# Patient Record
Sex: FEMALE | Race: WHITE | Employment: FULL TIME | URBAN - NONMETROPOLITAN AREA
[De-identification: names, ages, dates, MRNs, and addresses within clinical notes are randomized per-mention and may not be internally consistent; named-entity substitution may affect disease eponyms.]

---

## 2014-07-01 LAB
BUN BLDV-MCNC: 16 MG/DL
CALCIUM SERPL-MCNC: 9.8 MG/DL
CHLORIDE BLD-SCNC: 107 MMOL/L
CO2: 27 MMOL/L
CREAT SERPL-MCNC: 0.8 MG/DL
GFR CALCULATED: NORMAL
GLUCOSE BLD-MCNC: 94 MG/DL
POTASSIUM SERPL-SCNC: 4.5 MMOL/L
SODIUM BLD-SCNC: 144 MMOL/L

## 2017-02-11 LAB
CHOLESTEROL, TOTAL: 209 MG/DL
CHOLESTEROL/HDL RATIO: 4.4
HDLC SERPL-MCNC: 47 MG/DL (ref 35–70)
LDL CHOLESTEROL CALCULATED: 113 MG/DL (ref 0–160)
TRIGL SERPL-MCNC: 245 MG/DL
VLDLC SERPL CALC-MCNC: 49 MG/DL

## 2017-07-27 VITALS
HEIGHT: 67 IN | WEIGHT: 239 LBS | BODY MASS INDEX: 37.51 KG/M2 | DIASTOLIC BLOOD PRESSURE: 80 MMHG | SYSTOLIC BLOOD PRESSURE: 114 MMHG

## 2017-07-27 DIAGNOSIS — E78.49 OTHER HYPERLIPIDEMIA: ICD-10-CM

## 2017-07-27 DIAGNOSIS — F32.9 MAJOR DEPRESSIVE DISORDER, SINGLE EPISODE, UNSPECIFIED: ICD-10-CM

## 2017-07-27 DIAGNOSIS — I10 HYPERTENSION, ESSENTIAL: ICD-10-CM

## 2017-07-27 DIAGNOSIS — N92.0 EXCESSIVE OR FREQUENT MENSTRUATION: ICD-10-CM

## 2017-07-27 PROBLEM — E78.5 HYPERLIPIDEMIA: Status: ACTIVE | Noted: 2017-07-27

## 2017-08-04 ENCOUNTER — OFFICE VISIT (OUTPATIENT)
Dept: FAMILY MEDICINE CLINIC | Age: 52
End: 2017-08-04
Payer: COMMERCIAL

## 2017-08-04 VITALS
DIASTOLIC BLOOD PRESSURE: 60 MMHG | WEIGHT: 241 LBS | HEART RATE: 78 BPM | BODY MASS INDEX: 38.32 KG/M2 | SYSTOLIC BLOOD PRESSURE: 110 MMHG

## 2017-08-04 DIAGNOSIS — Z13.1 SCREENING FOR DIABETES MELLITUS: ICD-10-CM

## 2017-08-04 DIAGNOSIS — F32.9 MAJOR DEPRESSIVE DISORDER, SINGLE EPISODE, UNSPECIFIED: Primary | ICD-10-CM

## 2017-08-04 DIAGNOSIS — Z13.220 SCREENING, LIPID: ICD-10-CM

## 2017-08-04 DIAGNOSIS — I10 HYPERTENSION, ESSENTIAL: ICD-10-CM

## 2017-08-04 PROBLEM — N92.0 EXCESSIVE OR FREQUENT MENSTRUATION: Status: RESOLVED | Noted: 2017-07-27 | Resolved: 2017-08-04

## 2017-08-04 PROCEDURE — 99214 OFFICE O/P EST MOD 30 MIN: CPT | Performed by: FAMILY MEDICINE

## 2017-08-04 RX ORDER — CITALOPRAM 40 MG/1
1 TABLET ORAL DAILY
COMMUNITY
Start: 2017-06-19 | End: 2017-10-26 | Stop reason: SDUPTHER

## 2017-08-04 ASSESSMENT — ENCOUNTER SYMPTOMS
CONSTIPATION: 0
DIARRHEA: 0
ABDOMINAL PAIN: 0
SHORTNESS OF BREATH: 0

## 2017-08-04 ASSESSMENT — PATIENT HEALTH QUESTIONNAIRE - PHQ9
2. FEELING DOWN, DEPRESSED OR HOPELESS: 0
SUM OF ALL RESPONSES TO PHQ QUESTIONS 1-9: 0
1. LITTLE INTEREST OR PLEASURE IN DOING THINGS: 0
SUM OF ALL RESPONSES TO PHQ9 QUESTIONS 1 & 2: 0

## 2017-10-27 RX ORDER — CITALOPRAM 40 MG/1
40 TABLET ORAL DAILY
Qty: 30 TABLET | Refills: 5 | Status: SHIPPED | OUTPATIENT
Start: 2017-10-27 | End: 2018-02-14 | Stop reason: SDUPTHER

## 2018-02-09 ENCOUNTER — OFFICE VISIT (OUTPATIENT)
Dept: FAMILY MEDICINE CLINIC | Age: 53
End: 2018-02-09
Payer: COMMERCIAL

## 2018-02-09 VITALS
HEART RATE: 63 BPM | OXYGEN SATURATION: 97 % | SYSTOLIC BLOOD PRESSURE: 130 MMHG | DIASTOLIC BLOOD PRESSURE: 82 MMHG | BODY MASS INDEX: 38.51 KG/M2 | WEIGHT: 242.2 LBS

## 2018-02-09 DIAGNOSIS — F32.4 MAJOR DEPRESSIVE DISORDER WITH SINGLE EPISODE, IN PARTIAL REMISSION (HCC): Primary | ICD-10-CM

## 2018-02-09 DIAGNOSIS — I10 HYPERTENSION, ESSENTIAL: ICD-10-CM

## 2018-02-09 DIAGNOSIS — M79.671 FOOT PAIN, RIGHT: ICD-10-CM

## 2018-02-09 PROCEDURE — 99214 OFFICE O/P EST MOD 30 MIN: CPT | Performed by: FAMILY MEDICINE

## 2018-02-09 RX ORDER — CITALOPRAM 20 MG/1
50 TABLET ORAL DAILY
Qty: 225 TABLET | Refills: 3 | Status: SHIPPED | OUTPATIENT
Start: 2018-02-09 | End: 2019-05-29 | Stop reason: SDUPTHER

## 2018-02-09 RX ORDER — M-VIT,TX,IRON,MINS/CALC/FOLIC 27MG-0.4MG
1 TABLET ORAL DAILY
COMMUNITY

## 2018-02-09 RX ORDER — CETIRIZINE HYDROCHLORIDE 10 MG/1
10 TABLET ORAL DAILY
COMMUNITY

## 2018-02-09 ASSESSMENT — ENCOUNTER SYMPTOMS
WHEEZING: 0
BLURRED VISION: 0
DIARRHEA: 0
SHORTNESS OF BREATH: 0
CONSTIPATION: 0
COUGH: 1
ABDOMINAL PAIN: 0

## 2018-02-09 NOTE — PROGRESS NOTES
Review of Systems   Respiratory: Positive for cough ( little bit, started within the last few weeks. states nothing that seems to be getting any worse). Negative for shortness of breath and wheezing. Cardiovascular: Negative for chest pain, palpitations and leg swelling. Gastrointestinal: Negative for abdominal pain, constipation and diarrhea. Genitourinary: Negative for frequency and urgency. Musculoskeletal: Negative for joint pain and myalgias. Neurological: Negative for dizziness and headaches. Psychiatric/Behavioral: Negative for depression. The patient is nervous/anxious ( work related ). Would like to discuss increasing medication dosage due to increased anxiety. Hurt back a month ago now back is fine, muscle pain related after lifting a tv. Was taking a muscle relaxer. Had some dizziness/lightheadedness and felt warm. Believes it may be blood pressure related.

## 2018-02-14 DIAGNOSIS — F32.4 MAJOR DEPRESSIVE DISORDER WITH SINGLE EPISODE, IN PARTIAL REMISSION (HCC): ICD-10-CM

## 2018-02-14 RX ORDER — CITALOPRAM 40 MG/1
40 TABLET ORAL DAILY
Qty: 90 TABLET | Refills: 1 | Status: SHIPPED | OUTPATIENT
Start: 2018-02-14 | End: 2018-03-14 | Stop reason: CLARIF

## 2018-02-14 RX ORDER — CITALOPRAM 20 MG/1
50 TABLET ORAL DAILY
Qty: 225 TABLET | Refills: 3 | Status: CANCELLED | OUTPATIENT
Start: 2018-02-14

## 2018-02-14 NOTE — TELEPHONE ENCOUNTER
Kerri Castro is calling to request a refill on the following medication(s):  Requested Prescriptions     Pending Prescriptions Disp Refills    citalopram (CELEXA) 20 MG tablet 225 tablet 3     Sig: Take 2.5 tablets by mouth daily       Last Visit Date (If Applicable):  3/2/1944    Next Visit Date:    3/21/2018

## 2018-02-14 NOTE — TELEPHONE ENCOUNTER
Claudetta Piano is calling to request a refill on the following medication(s):  Requested Prescriptions     Pending Prescriptions Disp Refills    citalopram (CELEXA) 40 MG tablet 90 tablet 1     Sig: Take 1 tablet by mouth daily       Last Visit Date (If Applicable):  1/9/3491    Next Visit Date:    3/21/2018

## 2018-02-19 ENCOUNTER — TELEPHONE (OUTPATIENT)
Dept: FAMILY MEDICINE CLINIC | Age: 53
End: 2018-02-19

## 2018-02-28 ENCOUNTER — OFFICE VISIT (OUTPATIENT)
Dept: PODIATRY | Age: 53
End: 2018-02-28
Payer: COMMERCIAL

## 2018-02-28 VITALS
RESPIRATION RATE: 20 BRPM | WEIGHT: 244 LBS | DIASTOLIC BLOOD PRESSURE: 74 MMHG | HEART RATE: 72 BPM | HEIGHT: 67 IN | SYSTOLIC BLOOD PRESSURE: 120 MMHG | BODY MASS INDEX: 38.3 KG/M2

## 2018-02-28 DIAGNOSIS — M19.079 INFLAMMATION OF FOOT JOINT: ICD-10-CM

## 2018-02-28 DIAGNOSIS — M20.5X1 HALLUX LIMITUS OF RIGHT FOOT: Primary | ICD-10-CM

## 2018-02-28 PROCEDURE — 99202 OFFICE O/P NEW SF 15 MIN: CPT | Performed by: PODIATRIST

## 2018-03-05 ENCOUNTER — TELEPHONE (OUTPATIENT)
Dept: PODIATRY | Age: 53
End: 2018-03-05

## 2018-03-14 ENCOUNTER — OFFICE VISIT (OUTPATIENT)
Dept: FAMILY MEDICINE CLINIC | Age: 53
End: 2018-03-14
Payer: COMMERCIAL

## 2018-03-14 VITALS
HEART RATE: 66 BPM | BODY MASS INDEX: 38.28 KG/M2 | WEIGHT: 244.4 LBS | DIASTOLIC BLOOD PRESSURE: 88 MMHG | SYSTOLIC BLOOD PRESSURE: 132 MMHG

## 2018-03-14 DIAGNOSIS — M21.611 BUNION, RIGHT FOOT: Primary | ICD-10-CM

## 2018-03-14 DIAGNOSIS — I10 HYPERTENSION, ESSENTIAL: ICD-10-CM

## 2018-03-14 DIAGNOSIS — F32.4 MAJOR DEPRESSIVE DISORDER WITH SINGLE EPISODE, IN PARTIAL REMISSION (HCC): ICD-10-CM

## 2018-03-14 PROCEDURE — 93000 ELECTROCARDIOGRAM COMPLETE: CPT | Performed by: FAMILY MEDICINE

## 2018-03-14 PROCEDURE — 99213 OFFICE O/P EST LOW 20 MIN: CPT | Performed by: FAMILY MEDICINE

## 2018-03-18 ENCOUNTER — TELEPHONE (OUTPATIENT)
Dept: FAMILY MEDICINE CLINIC | Age: 53
End: 2018-03-18

## 2018-03-18 NOTE — TELEPHONE ENCOUNTER
Please let Loren Nino know that I do have the name and info for a physician for her daughter in [place]. I do not know this physician personally but I do know she is a [facility] Physician.     Dr. Theresa Hardin  Specialties : Family Medicine  [place], [place], [address], [address]

## 2018-03-23 ENCOUNTER — APPOINTMENT (OUTPATIENT)
Dept: GENERAL RADIOLOGY | Age: 53
End: 2018-03-23
Attending: PODIATRIST
Payer: COMMERCIAL

## 2018-03-23 ENCOUNTER — ANESTHESIA EVENT (OUTPATIENT)
Dept: OPERATING ROOM | Age: 53
End: 2018-03-23
Payer: COMMERCIAL

## 2018-03-23 ENCOUNTER — ANESTHESIA (OUTPATIENT)
Dept: OPERATING ROOM | Age: 53
End: 2018-03-23
Payer: COMMERCIAL

## 2018-03-23 ENCOUNTER — HOSPITAL ENCOUNTER (OUTPATIENT)
Age: 53
Setting detail: OUTPATIENT SURGERY
Discharge: HOME | End: 2018-03-23
Attending: PODIATRIST | Admitting: PODIATRIST
Payer: COMMERCIAL

## 2018-03-23 VITALS
RESPIRATION RATE: 9 BRPM | SYSTOLIC BLOOD PRESSURE: 112 MMHG | OXYGEN SATURATION: 96 % | DIASTOLIC BLOOD PRESSURE: 57 MMHG

## 2018-03-23 VITALS
RESPIRATION RATE: 16 BRPM | TEMPERATURE: 97.4 F | HEIGHT: 68 IN | HEART RATE: 53 BPM | BODY MASS INDEX: 36.8 KG/M2 | WEIGHT: 242.8 LBS | DIASTOLIC BLOOD PRESSURE: 62 MMHG | OXYGEN SATURATION: 93 % | SYSTOLIC BLOOD PRESSURE: 119 MMHG

## 2018-03-23 DIAGNOSIS — M20.5X1 HALLUX LIMITUS, RIGHT: Primary | ICD-10-CM

## 2018-03-23 DIAGNOSIS — M79.671 RIGHT FOOT PAIN: ICD-10-CM

## 2018-03-23 PROCEDURE — 2500000003 HC RX 250 WO HCPCS

## 2018-03-23 PROCEDURE — 73630 X-RAY EXAM OF FOOT: CPT

## 2018-03-23 PROCEDURE — 2500000003 HC RX 250 WO HCPCS: Performed by: NURSE ANESTHETIST, CERTIFIED REGISTERED

## 2018-03-23 PROCEDURE — 6360000002 HC RX W HCPCS

## 2018-03-23 PROCEDURE — 88311 DECALCIFY TISSUE: CPT

## 2018-03-23 PROCEDURE — 6360000002 HC RX W HCPCS: Performed by: PODIATRIST

## 2018-03-23 PROCEDURE — 3600000012 HC SURGERY LEVEL 2 ADDTL 15MIN: Performed by: PODIATRIST

## 2018-03-23 PROCEDURE — 3700000001 HC ADD 15 MINUTES (ANESTHESIA): Performed by: PODIATRIST

## 2018-03-23 PROCEDURE — 88304 TISSUE EXAM BY PATHOLOGIST: CPT

## 2018-03-23 PROCEDURE — 3700000000 HC ANESTHESIA ATTENDED CARE: Performed by: PODIATRIST

## 2018-03-23 PROCEDURE — 2500000003 HC RX 250 WO HCPCS: Performed by: PODIATRIST

## 2018-03-23 PROCEDURE — 7100000010 HC PHASE II RECOVERY - FIRST 15 MIN: Performed by: PODIATRIST

## 2018-03-23 PROCEDURE — 28289 CORRJ HALUX RIGDUS W/O IMPLT: CPT | Performed by: PODIATRIST

## 2018-03-23 PROCEDURE — 01480 ANES OPEN PX LOWER L/A/F NOS: CPT | Performed by: NURSE ANESTHETIST, CERTIFIED REGISTERED

## 2018-03-23 PROCEDURE — 6360000002 HC RX W HCPCS: Performed by: NURSE ANESTHETIST, CERTIFIED REGISTERED

## 2018-03-23 PROCEDURE — 2580000003 HC RX 258: Performed by: PODIATRIST

## 2018-03-23 PROCEDURE — A6454 SELF-ADHER BAND W>=3" <5"/YD: HCPCS | Performed by: PODIATRIST

## 2018-03-23 PROCEDURE — 7100000011 HC PHASE II RECOVERY - ADDTL 15 MIN: Performed by: PODIATRIST

## 2018-03-23 PROCEDURE — 3600000002 HC SURGERY LEVEL 2 BASE: Performed by: PODIATRIST

## 2018-03-23 RX ORDER — HYDROCODONE BITARTRATE AND ACETAMINOPHEN 5; 325 MG/1; MG/1
1 TABLET ORAL PRN
Status: DISCONTINUED | OUTPATIENT
Start: 2018-03-23 | End: 2018-03-23 | Stop reason: HOSPADM

## 2018-03-23 RX ORDER — PROPOFOL 10 MG/ML
INJECTION, EMULSION INTRAVENOUS PRN
Status: DISCONTINUED | OUTPATIENT
Start: 2018-03-23 | End: 2018-03-23 | Stop reason: SDUPTHER

## 2018-03-23 RX ORDER — MIDAZOLAM HYDROCHLORIDE 1 MG/ML
INJECTION INTRAMUSCULAR; INTRAVENOUS PRN
Status: DISCONTINUED | OUTPATIENT
Start: 2018-03-23 | End: 2018-03-23 | Stop reason: SDUPTHER

## 2018-03-23 RX ORDER — LIDOCAINE HYDROCHLORIDE 20 MG/ML
INJECTION, SOLUTION INFILTRATION; PERINEURAL PRN
Status: DISCONTINUED | OUTPATIENT
Start: 2018-03-23 | End: 2018-03-23 | Stop reason: SDUPTHER

## 2018-03-23 RX ORDER — KETOROLAC TROMETHAMINE 30 MG/ML
INJECTION, SOLUTION INTRAMUSCULAR; INTRAVENOUS PRN
Status: DISCONTINUED | OUTPATIENT
Start: 2018-03-23 | End: 2018-03-23 | Stop reason: SDUPTHER

## 2018-03-23 RX ORDER — ONDANSETRON 2 MG/ML
4 INJECTION INTRAMUSCULAR; INTRAVENOUS
Status: DISCONTINUED | OUTPATIENT
Start: 2018-03-23 | End: 2018-03-23 | Stop reason: HOSPADM

## 2018-03-23 RX ORDER — PROPOFOL 10 MG/ML
INJECTION, EMULSION INTRAVENOUS CONTINUOUS PRN
Status: DISCONTINUED | OUTPATIENT
Start: 2018-03-23 | End: 2018-03-23 | Stop reason: SDUPTHER

## 2018-03-23 RX ORDER — MORPHINE SULFATE 2 MG/ML
2 INJECTION, SOLUTION INTRAMUSCULAR; INTRAVENOUS EVERY 5 MIN PRN
Status: DISCONTINUED | OUTPATIENT
Start: 2018-03-23 | End: 2018-03-23 | Stop reason: HOSPADM

## 2018-03-23 RX ORDER — DIPHENHYDRAMINE HYDROCHLORIDE 50 MG/ML
12.5 INJECTION INTRAMUSCULAR; INTRAVENOUS
Status: DISCONTINUED | OUTPATIENT
Start: 2018-03-23 | End: 2018-03-23 | Stop reason: HOSPADM

## 2018-03-23 RX ORDER — HYDROCODONE BITARTRATE AND ACETAMINOPHEN 5; 325 MG/1; MG/1
2 TABLET ORAL PRN
Status: DISCONTINUED | OUTPATIENT
Start: 2018-03-23 | End: 2018-03-23 | Stop reason: HOSPADM

## 2018-03-23 RX ORDER — FENTANYL CITRATE 50 UG/ML
INJECTION, SOLUTION INTRAMUSCULAR; INTRAVENOUS PRN
Status: DISCONTINUED | OUTPATIENT
Start: 2018-03-23 | End: 2018-03-23 | Stop reason: SDUPTHER

## 2018-03-23 RX ORDER — HYDROCODONE BITARTRATE AND ACETAMINOPHEN 5; 325 MG/1; MG/1
1 TABLET ORAL EVERY 6 HOURS PRN
Qty: 28 TABLET | Refills: 0 | Status: SHIPPED | OUTPATIENT
Start: 2018-03-23 | End: 2018-03-30

## 2018-03-23 RX ORDER — SODIUM CHLORIDE, SODIUM LACTATE, POTASSIUM CHLORIDE, CALCIUM CHLORIDE 600; 310; 30; 20 MG/100ML; MG/100ML; MG/100ML; MG/100ML
INJECTION, SOLUTION INTRAVENOUS CONTINUOUS
Status: DISCONTINUED | OUTPATIENT
Start: 2018-03-23 | End: 2018-03-23 | Stop reason: HOSPADM

## 2018-03-23 RX ADMIN — LIDOCAINE HYDROCHLORIDE 100 MG: 20 INJECTION, SOLUTION INFILTRATION; PERINEURAL at 09:26

## 2018-03-23 RX ADMIN — Medication 2 G: at 09:34

## 2018-03-23 RX ADMIN — FENTANYL CITRATE 25 MCG: 50 INJECTION, SOLUTION INTRAMUSCULAR; INTRAVENOUS at 09:32

## 2018-03-23 RX ADMIN — SODIUM CHLORIDE, POTASSIUM CHLORIDE, SODIUM LACTATE AND CALCIUM CHLORIDE: 600; 310; 30; 20 INJECTION, SOLUTION INTRAVENOUS at 09:16

## 2018-03-23 RX ADMIN — MIDAZOLAM HYDROCHLORIDE 2 MG: 1 INJECTION, SOLUTION INTRAMUSCULAR; INTRAVENOUS at 09:21

## 2018-03-23 RX ADMIN — KETOROLAC TROMETHAMINE 30 MG: 30 INJECTION, SOLUTION INTRAMUSCULAR; INTRAVENOUS at 09:21

## 2018-03-23 RX ADMIN — FENTANYL CITRATE 50 MCG: 50 INJECTION, SOLUTION INTRAMUSCULAR; INTRAVENOUS at 09:26

## 2018-03-23 RX ADMIN — PROPOFOL 70 MG: 10 INJECTION, EMULSION INTRAVENOUS at 09:26

## 2018-03-23 RX ADMIN — FENTANYL CITRATE 25 MCG: 50 INJECTION, SOLUTION INTRAMUSCULAR; INTRAVENOUS at 09:44

## 2018-03-23 RX ADMIN — PROPOFOL 100 MCG/KG/MIN: 10 INJECTION, EMULSION INTRAVENOUS at 09:24

## 2018-03-23 ASSESSMENT — PAIN SCALES - GENERAL
PAINLEVEL_OUTOF10: 0

## 2018-03-23 ASSESSMENT — PAIN DESCRIPTION - DESCRIPTORS: DESCRIPTORS: TINGLING

## 2018-03-23 ASSESSMENT — PAIN - FUNCTIONAL ASSESSMENT: PAIN_FUNCTIONAL_ASSESSMENT: 0-10

## 2018-03-23 NOTE — ANESTHESIA PRE PROCEDURE
during the procedure. Common risks: N/V, slowed breathing, and awareness. Uncommon Risks: Respiratory arrest requiring advanced airway management, stroke, or death.   )  Induction: intravenous. Anesthetic plan and risks discussed with patient.       Plan discussed with surgical team.                  Mary Ellen Kelly CRNA   3/23/2018

## 2018-03-23 NOTE — OP NOTE
3/23/18  Surgeon: Shruthi Drew DPM  Pre-operative diagnosis:hallux limitus R  Post-operative diagnosis: same as pre op  Procedure:cheilectomy R 1st MPJ  Anesthesia:MAC  Complications: none  EBL: <5  Specimen: none  Findings and Procedure: This 47 y.o. female presents to [facility] for surgical intervention for hallux limitus R. This patient has failed outpatient conservative therapy. Patient is NPO since midnight. H&P, allergy history, and consent form are signed and in the chart. In the pre-operative area an IV was started then patient taken to the operating room and placed on the operating table in a supine position. After induction of anesthesia, a total of 10 cc 50:50 mixture of 1% lidocaine plain and .25% marcaine plain were used to block the operative site. The Right foot was prepped and draped in the usual sterile manner. Anesthesia was checked and was adequate. A #15 blade was used for a linear dorsal incision. Dissection went deep and care was used to protect or ligate any neurovascular structures as needed. undelrying capsule then identified and incised with deep #15 blade, large bone fragment free floating over the 1st MPJ, freed up and removed, dorsal exostosis R 1st MPJ freed up and removed with sagittal saw, power rasp used to smooth all edges, area flushed with normal saline, bone sent to path, 3-0 vicryl to close capsule and sub q tissue, 4-0 nylon to close skin in simple interuppted fashion . A dry sterile dressing took place with adaptic, 4X4s, sai and coban. Patient had a prompt hyperemic response noted to the digits upon release of the tourniquet. Patient was taken to the post operative unit with vital signs stable and neurovascular status intact. They will ice and elevate as directed. Other orders were: xrays. A sx shoe was dispensed for appropriate offloading. Rx given were norco #30. Post operative directions were dispensed. Follow up in 1  weeks.

## 2018-03-27 LAB — SURGICAL PATHOLOGY REPORT: NORMAL

## 2018-03-30 ENCOUNTER — OFFICE VISIT (OUTPATIENT)
Dept: PODIATRY | Age: 53
End: 2018-03-30

## 2018-03-30 VITALS
TEMPERATURE: 98.6 F | WEIGHT: 245.8 LBS | SYSTOLIC BLOOD PRESSURE: 132 MMHG | HEIGHT: 68 IN | DIASTOLIC BLOOD PRESSURE: 80 MMHG | HEART RATE: 80 BPM | RESPIRATION RATE: 20 BRPM | BODY MASS INDEX: 37.25 KG/M2

## 2018-03-30 DIAGNOSIS — M20.5X1 HALLUX LIMITUS, RIGHT: Primary | ICD-10-CM

## 2018-03-30 PROCEDURE — 99024 POSTOP FOLLOW-UP VISIT: CPT | Performed by: PODIATRIST

## 2018-04-06 ENCOUNTER — OFFICE VISIT (OUTPATIENT)
Dept: PODIATRY | Age: 53
End: 2018-04-06

## 2018-04-06 VITALS
WEIGHT: 245 LBS | DIASTOLIC BLOOD PRESSURE: 78 MMHG | TEMPERATURE: 98 F | HEIGHT: 68 IN | HEART RATE: 80 BPM | BODY MASS INDEX: 37.13 KG/M2 | SYSTOLIC BLOOD PRESSURE: 130 MMHG

## 2018-04-06 DIAGNOSIS — M20.5X1 HALLUX LIMITUS, RIGHT: Primary | ICD-10-CM

## 2018-04-06 PROCEDURE — 99024 POSTOP FOLLOW-UP VISIT: CPT | Performed by: PODIATRIST

## 2018-04-20 ENCOUNTER — OFFICE VISIT (OUTPATIENT)
Dept: PODIATRY | Age: 53
End: 2018-04-20

## 2018-04-20 VITALS
BODY MASS INDEX: 38.77 KG/M2 | SYSTOLIC BLOOD PRESSURE: 110 MMHG | WEIGHT: 247 LBS | HEIGHT: 67 IN | DIASTOLIC BLOOD PRESSURE: 64 MMHG | HEART RATE: 72 BPM

## 2018-04-20 DIAGNOSIS — M20.5X1 HALLUX LIMITUS, RIGHT: Primary | ICD-10-CM

## 2018-04-20 DIAGNOSIS — M19.079 INFLAMMATION OF FOOT JOINT: ICD-10-CM

## 2018-04-20 PROCEDURE — 99024 POSTOP FOLLOW-UP VISIT: CPT | Performed by: PODIATRIST

## 2018-04-20 RX ORDER — METHYLPREDNISOLONE 4 MG/1
TABLET ORAL
Qty: 1 KIT | Refills: 0 | Status: SHIPPED | OUTPATIENT
Start: 2018-04-20 | End: 2018-05-11 | Stop reason: ALTCHOICE

## 2018-05-11 ENCOUNTER — OFFICE VISIT (OUTPATIENT)
Dept: FAMILY MEDICINE CLINIC | Age: 53
End: 2018-05-11
Payer: COMMERCIAL

## 2018-05-11 VITALS
SYSTOLIC BLOOD PRESSURE: 138 MMHG | HEART RATE: 67 BPM | BODY MASS INDEX: 38.15 KG/M2 | WEIGHT: 243.6 LBS | DIASTOLIC BLOOD PRESSURE: 86 MMHG | OXYGEN SATURATION: 96 %

## 2018-05-11 DIAGNOSIS — M20.5X1 HALLUX LIMITUS, RIGHT: ICD-10-CM

## 2018-05-11 DIAGNOSIS — K52.9 ACUTE GASTROENTERITIS: ICD-10-CM

## 2018-05-11 DIAGNOSIS — F32.4 MAJOR DEPRESSIVE DISORDER WITH SINGLE EPISODE, IN PARTIAL REMISSION (HCC): Primary | ICD-10-CM

## 2018-05-11 PROCEDURE — 99214 OFFICE O/P EST MOD 30 MIN: CPT | Performed by: FAMILY MEDICINE

## 2018-11-26 ENCOUNTER — OFFICE VISIT (OUTPATIENT)
Dept: FAMILY MEDICINE CLINIC | Age: 53
End: 2018-11-26
Payer: COMMERCIAL

## 2018-11-26 VITALS
HEART RATE: 67 BPM | WEIGHT: 242.31 LBS | OXYGEN SATURATION: 98 % | BODY MASS INDEX: 37.95 KG/M2 | DIASTOLIC BLOOD PRESSURE: 88 MMHG | SYSTOLIC BLOOD PRESSURE: 134 MMHG

## 2018-11-26 DIAGNOSIS — I10 HYPERTENSION, ESSENTIAL: ICD-10-CM

## 2018-11-26 DIAGNOSIS — Z13.1 SCREENING FOR DIABETES MELLITUS: ICD-10-CM

## 2018-11-26 DIAGNOSIS — Z13.220 SCREENING, LIPID: ICD-10-CM

## 2018-11-26 DIAGNOSIS — F32.5 MAJOR DEPRESSIVE DISORDER WITH SINGLE EPISODE, IN FULL REMISSION (HCC): Primary | ICD-10-CM

## 2018-11-26 DIAGNOSIS — Z12.39 SCREENING BREAST EXAMINATION: ICD-10-CM

## 2018-11-26 DIAGNOSIS — Z12.11 ENCOUNTER FOR SCREENING COLONOSCOPY: ICD-10-CM

## 2018-11-26 PROCEDURE — 99214 OFFICE O/P EST MOD 30 MIN: CPT | Performed by: FAMILY MEDICINE

## 2018-11-26 ASSESSMENT — ENCOUNTER SYMPTOMS
BLURRED VISION: 0
SHORTNESS OF BREATH: 0
ORTHOPNEA: 0

## 2018-11-26 NOTE — LETTER
[facility]  [address] [place]  Phone: [phone]  Fax: [phone]    Dayne Willett MD        December 4, 2018    Malinda Capellan  [address] [facility]      Dear Malinda Capellan:    According to the radiologists interpretation, your mammogram did not show any significant findings. Please remember that some cancers (about 10-15%) cannot be found by mammography alone, and that early detection requires a combination of monthly self-examination, yearly physical examination, and periodic mammography. The American Cancer Society Guidelines recommend screening mammograms and physical breast examinations every year beginning at the age of 36. Please continue regular breast self-examinations and report any changes that concern you, even before your next appointment. If you have any further questions, please don't hesitate to call the office.     Sincerely,           Dayne Willett MD

## 2018-11-26 NOTE — PROGRESS NOTES
1ST MP JT W/O IMPLT Right 3/23/2018    Right 1st MPJ CHEILECTOMY performed by Jose C Galvan DPM at [facility] OR       Family History   Problem Relation Age of Onset    Heart Disease Mother     Diabetes Mother     High Cholesterol Mother     Diabetes Father     Anxiety Disorder Daughter        Social History   Substance Use Topics    Smoking status: Never Smoker    Smokeless tobacco: Never Used    Alcohol use No      Current Outpatient Prescriptions   Medication Sig Dispense Refill    Multiple Vitamins-Minerals (THERAPEUTIC MULTIVITAMIN-MINERALS) tablet Take 1 tablet by mouth daily      cetirizine (ZYRTEC) 10 MG tablet Take 10 mg by mouth daily      citalopram (CELEXA) 20 MG tablet Take 2.5 tablets by mouth daily 225 tablet 3     No current facility-administered medications for this visit. No Known Allergies    Health Maintenance   Topic Date Due    HIV screen  03/25/1981    DTaP/Tdap/Td vaccine (1 - Tdap) 03/25/1985    Diabetes screen  03/25/2006    Potassium monitoring  07/01/2015    Creatinine monitoring  07/01/2015    Breast cancer screen  03/25/2016    Shingles Vaccine (1 of 2 - 2 Dose Series) 03/25/2016    Colon cancer screen colonoscopy  03/25/2016    Lipid screen  02/11/2022    Flu vaccine  Completed       Subjective:      Review of Systems   Constitutional: Negative for malaise/fatigue. Eyes: Negative for blurred vision. Respiratory: Negative for shortness of breath. Cardiovascular: Negative for chest pain, palpitations, orthopnea and PND. Musculoskeletal: Negative for neck pain. Neurological: Negative for headaches. Objective:     /88   Pulse 67   Wt 242 lb 5 oz (109.9 kg)   SpO2 98%   BMI 37.95 kg/m²     Physical Exam   Constitutional: She is oriented to person, place, and time. She appears well-developed and well-nourished. HENT:   Head: Normocephalic and atraumatic. Eyes: Conjunctivae and EOM are normal.   Neck: Normal range of motion. Neck supple.

## 2018-12-20 ENCOUNTER — OFFICE VISIT (OUTPATIENT)
Dept: SURGERY | Age: 53
End: 2018-12-20
Payer: COMMERCIAL

## 2018-12-20 VITALS — SYSTOLIC BLOOD PRESSURE: 158 MMHG | HEART RATE: 73 BPM | DIASTOLIC BLOOD PRESSURE: 93 MMHG | TEMPERATURE: 98 F

## 2018-12-20 DIAGNOSIS — R53.83 FATIGUE, UNSPECIFIED TYPE: ICD-10-CM

## 2018-12-20 DIAGNOSIS — Z12.11 ENCOUNTER FOR SCREENING COLONOSCOPY: Primary | ICD-10-CM

## 2018-12-20 PROCEDURE — 99203 OFFICE O/P NEW LOW 30 MIN: CPT | Performed by: SURGERY

## 2018-12-21 NOTE — PROGRESS NOTES
Medications:   Prior to Admission medications    Medication Sig Start Date End Date Taking? Authorizing Provider   Multiple Vitamins-Minerals (THERAPEUTIC MULTIVITAMIN-MINERALS) tablet Take 1 tablet by mouth daily   Yes Historical Provider, MD   cetirizine (ZYRTEC) 10 MG tablet Take 10 mg by mouth daily   Yes Historical Provider, MD   citalopram (CELEXA) 20 MG tablet Take 2.5 tablets by mouth daily 2/9/18  Yes Krista Cronin MD       Allergies  Patient has no known allergies. Review of Systems:  General: Denies any fever, chills. Reports fatigue  Eyes: Denies any changes in vision, diplopia or eye pain  Ears, Nose, Mouth: Denies changes in hearing/tinnitus or drainage from ears, no rhinorrhea or bloody nose, no difficulty chewing  Throat: no difficulty swallowing, no throat pain  Respiratory: Denies any shortness of breath or cough. Cardiac: Denies any chest pain, palpitations, claudication or edema. Gastrointestinal: Denies any melena, hematochezia, hematemesis or pyrosis. Genitourinary: Denies any frequency, urgency, hesitancy or incontinence. Musculoskeletal: Denies worsening muscle weakness or recent trauma  Skin: Denies rashes or lesions  Psychiatric: Denies any recent changes in mood or affect  Hematologic: Denies bruising or bleeding easily. PHYSICAL EXAMINATION  Vitals:   Vitals:    12/20/18 1628   BP: (!) 158/93   Pulse: 73   Temp: 98 °F (36.7 °C)       General Appearance:  awake, alert, no acute distress, well developed, well nourished   Skin:  Skin color, texture, turgor normal. No rashes or lesions. Head/face:  NCAT, face symmetrical  Eyes:  PERRL, no evidence of conjunctivitis or ptosis bilaterally  Ears:  External ears and canals grossly normal, no evidence of otorrhea. Nose/Sinuses:  Nares normal. Septum midline. Mucosa normal. No external drainage noted. Mouth/Neck:  Mucosa moist.  No external oral lesions. Trachea midline. No visible masses.    Lungs:  Normal chest expansion,

## 2019-05-29 ENCOUNTER — OFFICE VISIT (OUTPATIENT)
Dept: FAMILY MEDICINE CLINIC | Age: 54
End: 2019-05-29
Payer: COMMERCIAL

## 2019-05-29 VITALS
OXYGEN SATURATION: 97 % | SYSTOLIC BLOOD PRESSURE: 126 MMHG | BODY MASS INDEX: 37.7 KG/M2 | DIASTOLIC BLOOD PRESSURE: 88 MMHG | WEIGHT: 240.7 LBS | HEART RATE: 67 BPM

## 2019-05-29 DIAGNOSIS — F32.4 MAJOR DEPRESSIVE DISORDER WITH SINGLE EPISODE, IN PARTIAL REMISSION (HCC): ICD-10-CM

## 2019-05-29 PROCEDURE — 99214 OFFICE O/P EST MOD 30 MIN: CPT | Performed by: FAMILY MEDICINE

## 2019-05-29 RX ORDER — CITALOPRAM 20 MG/1
40 TABLET ORAL DAILY
Qty: 180 TABLET | Refills: 3 | Status: SHIPPED | OUTPATIENT
Start: 2019-05-29 | End: 2020-06-23

## 2019-05-29 ASSESSMENT — ENCOUNTER SYMPTOMS
ABDOMINAL PAIN: 0
COUGH: 0
SHORTNESS OF BREATH: 0
WHEEZING: 0
CONSTIPATION: 0
DIARRHEA: 0

## 2019-05-29 NOTE — PROGRESS NOTES
[address] [place] [address] [address]  Dept: [phone]  Dept [facility]:[phone]    Martha Isaac is a 49 y.o. female who presents today for her medical conditions/complaints as notedbelow. Martha Isaac is c/o of 6 Month Follow-Up and Depression      HPI:     HPI    Has been on the citalopram and has been doing well on this. Did try to wean down on this. Was on the 50 mg dose and was cutting down by going to every other day. Has had a lot of regla in her life. When she started trying to wean down by going to the 50 mg every other day she started to be very irritated by little things, not nice at times to her  and more short tempered really.   Still sleeping well and not really down or depressed, just more irritable     BP Readings from Last 3 Encounters:   19 126/88   18 (!) 158/93   18 134/88          (goal 120/80)    Wt Readings from Last 3 Encounters:   19 240 lb 11.2 oz (109.2 kg)   18 242 lb 5 oz (109.9 kg)   18 243 lb 9.6 oz (110.5 kg)        Past Medical History:   Diagnosis Date    Depression     Hyperlipidemia     Seasonal allergies       Past Surgical History:   Procedure Laterality Date     SECTION      COLONOSCOPY      [facility] at [facility] with Dr. Anselmo Jean, TOTAL ABDOMINAL  2013    fibroids    NM HALLUX RIGIDUS W/CHEILECTOMY 1ST MP JT W/O IMPLT Right 3/23/2018    Right 1st MPJ CHEILECTOMY performed by Denette Burkitt, DPM at [facility] 197 History   Problem Relation Age of Onset    Heart Disease Mother     Diabetes Mother     High Cholesterol Mother     Diabetes Father     Anxiety Disorder Daughter        Social History     Tobacco Use    Smoking status: Never Smoker    Smokeless tobacco: Never Used   Substance Use Topics    Alcohol use: No      Current Outpatient Medications   Medication Sig Dispense Refill    citalopram (CELEXA) 20 MG tablet Take 2 tablets by mouth daily 180 tablet 3    Multiple Vitamins-Minerals (THERAPEUTIC MULTIVITAMIN-MINERALS) tablet Take 1 tablet by mouth daily      cetirizine (ZYRTEC) 10 MG tablet Take 10 mg by mouth daily       No current facility-administered medications for this visit. No Known Allergies    Health Maintenance   Topic Date Due    HIV screen  03/25/1981    DTaP/Tdap/Td vaccine (1 - Tdap) 03/25/1985    Potassium monitoring  07/01/2015    Creatinine monitoring  07/01/2015    Shingles Vaccine (1 of 2) 03/25/2016    Breast cancer screen  12/03/2020    Lipid screen  02/11/2022    Colon cancer screen colonoscopy  01/04/2029    Flu vaccine  Completed    Pneumococcal 0-64 years Vaccine  Aged Out       Subjective:      Review of Systems  Respiratory: Negative for cough, shortness of breath and wheezing. Cardiovascular: Negative for chest pain, palpitations and leg swelling. Gastrointestinal: Negative for abdominal pain, constipation and diarrhea. Genitourinary: Negative for frequency and urgency. Neurological: Negative for dizziness and headaches. Objective:     /88   Pulse 67   Wt 240 lb 11.2 oz (109.2 kg)   SpO2 97%   BMI 37.70 kg/m²     Physical Exam   Constitutional: She is oriented to person, place, and time. She appears well-developed and well-nourished. HENT:   Head: Normocephalic and atraumatic. Eyes: Conjunctivae and EOM are normal.   Neck: Normal range of motion. Neck supple. No JVD present. No thyromegaly present. Cardiovascular: Normal rate, regular rhythm and intact distal pulses. Exam reveals no gallop and no friction rub. No murmur heard. Pulmonary/Chest: Effort normal and breath sounds normal. No respiratory distress. Abdominal: Soft. Musculoskeletal: She exhibits no edema. Lymphadenopathy:     She has no cervical adenopathy. Neurological: She is alert and oriented to person, place, and time. Skin: Skin is warm.    Nursing note and vitals reviewed. Assessment/Plan:      Diagnosis Orders   1. Major depressive disorder with single episode, in partial remission (HCC)  citalopram (CELEXA) 20 MG tablet     Discussed weaning on the citalopram very slowly to have better chance of success with the symptoms remaining abated. Lab Results   Component Value Date    CHOL 209 02/11/2017    TRIG 245 02/11/2017    HDL 47 02/11/2017     07/01/2014    K 4.5 07/01/2014     07/01/2014    CREATININE 0.8 07/01/2014    BUN 16 07/01/2014    CO2 27 07/01/2014       Return in about 6 months (around 11/29/2019) for Medication recheck. Patient given educational materials - see patientinstructions. Discussed use, benefit, and side effects of prescribed medications. All patient questions answered. Pt voiced understanding. Reviewed health maintenance. Instructed to continue current medications, diet andexercise. Patient agreed with treatment plan. Follow up as directed.      Electronically signed by Morelia Novak MD on 6/2/2019

## 2019-11-29 ENCOUNTER — OFFICE VISIT (OUTPATIENT)
Dept: PRIMARY CARE CLINIC | Age: 54
End: 2019-11-29
Payer: COMMERCIAL

## 2019-11-29 ENCOUNTER — HOSPITAL ENCOUNTER (OUTPATIENT)
Age: 54
Discharge: HOME | End: 2019-11-29
Payer: COMMERCIAL

## 2019-11-29 VITALS
BODY MASS INDEX: 35.61 KG/M2 | WEIGHT: 235 LBS | DIASTOLIC BLOOD PRESSURE: 94 MMHG | HEIGHT: 68 IN | OXYGEN SATURATION: 98 % | SYSTOLIC BLOOD PRESSURE: 122 MMHG | RESPIRATION RATE: 18 BRPM | TEMPERATURE: 98.8 F | HEART RATE: 70 BPM

## 2019-11-29 DIAGNOSIS — J02.9 SORE THROAT: ICD-10-CM

## 2019-11-29 DIAGNOSIS — J02.9 SORE THROAT: Primary | ICD-10-CM

## 2019-11-29 LAB — S PYO AG THROAT QL: NORMAL

## 2019-11-29 PROCEDURE — 87651 STREP A DNA AMP PROBE: CPT

## 2019-11-29 PROCEDURE — 87880 STREP A ASSAY W/OPTIC: CPT | Performed by: FAMILY MEDICINE

## 2019-11-29 PROCEDURE — 99213 OFFICE O/P EST LOW 20 MIN: CPT | Performed by: FAMILY MEDICINE

## 2019-11-30 LAB
DIRECT EXAM: NORMAL
Lab: NORMAL
SPECIMEN DESCRIPTION: NORMAL

## 2020-11-17 RX ORDER — CITALOPRAM 20 MG/1
TABLET ORAL
Qty: 180 TABLET | Refills: 3 | Status: SHIPPED | OUTPATIENT
Start: 2020-11-17 | End: 2021-12-20

## 2021-07-08 ENCOUNTER — OFFICE VISIT (OUTPATIENT)
Dept: FAMILY MEDICINE CLINIC | Age: 56
End: 2021-07-08
Payer: COMMERCIAL

## 2021-07-08 ENCOUNTER — TELEPHONE (OUTPATIENT)
Dept: FAMILY MEDICINE CLINIC | Age: 56
End: 2021-07-08

## 2021-07-08 VITALS
HEIGHT: 68 IN | BODY MASS INDEX: 37.28 KG/M2 | WEIGHT: 246 LBS | OXYGEN SATURATION: 98 % | SYSTOLIC BLOOD PRESSURE: 134 MMHG | DIASTOLIC BLOOD PRESSURE: 76 MMHG | HEART RATE: 73 BPM

## 2021-07-08 DIAGNOSIS — R04.0 EPISTAXIS: Primary | ICD-10-CM

## 2021-07-08 DIAGNOSIS — Z13.220 SCREENING, LIPID: ICD-10-CM

## 2021-07-08 DIAGNOSIS — Z12.31 SCREENING MAMMOGRAM, ENCOUNTER FOR: ICD-10-CM

## 2021-07-08 DIAGNOSIS — Z13.1 SCREENING FOR DIABETES MELLITUS: ICD-10-CM

## 2021-07-08 DIAGNOSIS — M76.62 TENDONITIS, ACHILLES, LEFT: Primary | ICD-10-CM

## 2021-07-08 DIAGNOSIS — M79.672 PAIN OF LEFT HEEL: ICD-10-CM

## 2021-07-08 PROCEDURE — 99213 OFFICE O/P EST LOW 20 MIN: CPT | Performed by: FAMILY MEDICINE

## 2021-07-08 RX ORDER — MELOXICAM 15 MG/1
15 TABLET ORAL DAILY
Qty: 90 TABLET | Refills: 1 | Status: SHIPPED | OUTPATIENT
Start: 2021-07-08 | End: 2022-09-22

## 2021-07-08 SDOH — ECONOMIC STABILITY: FOOD INSECURITY: WITHIN THE PAST 12 MONTHS, YOU WORRIED THAT YOUR FOOD WOULD RUN OUT BEFORE YOU GOT MONEY TO BUY MORE.: NEVER TRUE

## 2021-07-08 SDOH — ECONOMIC STABILITY: TRANSPORTATION INSECURITY
IN THE PAST 12 MONTHS, HAS THE LACK OF TRANSPORTATION KEPT YOU FROM MEDICAL APPOINTMENTS OR FROM GETTING MEDICATIONS?: NO

## 2021-07-08 SDOH — ECONOMIC STABILITY: TRANSPORTATION INSECURITY
IN THE PAST 12 MONTHS, HAS LACK OF TRANSPORTATION KEPT YOU FROM MEETINGS, WORK, OR FROM GETTING THINGS NEEDED FOR DAILY LIVING?: NO

## 2021-07-08 SDOH — ECONOMIC STABILITY: FOOD INSECURITY: WITHIN THE PAST 12 MONTHS, THE FOOD YOU BOUGHT JUST DIDN'T LAST AND YOU DIDN'T HAVE MONEY TO GET MORE.: NEVER TRUE

## 2021-07-08 ASSESSMENT — PATIENT HEALTH QUESTIONNAIRE - PHQ9
SUM OF ALL RESPONSES TO PHQ QUESTIONS 1-9: 0
SUM OF ALL RESPONSES TO PHQ9 QUESTIONS 1 & 2: 0
SUM OF ALL RESPONSES TO PHQ QUESTIONS 1-9: 0
1. LITTLE INTEREST OR PLEASURE IN DOING THINGS: 0
SUM OF ALL RESPONSES TO PHQ QUESTIONS 1-9: 0
2. FEELING DOWN, DEPRESSED OR HOPELESS: 0

## 2021-07-08 ASSESSMENT — SOCIAL DETERMINANTS OF HEALTH (SDOH): HOW HARD IS IT FOR YOU TO PAY FOR THE VERY BASICS LIKE FOOD, HOUSING, MEDICAL CARE, AND HEATING?: NOT HARD AT ALL

## 2021-07-08 NOTE — PROGRESS NOTES
[address] [facility]  [address]  Dept: [phone]  Dept [phone]    Alis Noriega is a 55 y.o. female who presents today for her medical conditions/complaints as notedbelow. lAis Noriega is c/o of Epistaxis (4 times in 2 weeks at the beginning of . First time it took an hour to stop, other times took about a 1/2 hour. Blood was running out. Did cough up a blood clot once)      HPI:     HPI    Was at the beginning of  when she had this really start-- last one was the second week of . They all started at night. Woke up with the bloody nose. Another time was with a temperature difference and then started-- went from the hot to the cold. Took along time to stop aidan the first time. With one of the episodes had blood clot that came down the back of there throat and came out when she coughed. No headaches at all. Does have the seasonal allergies and is taking the cetirizine no OTC nasal sprays-- feels like the pill controls it well. Stepped between some metal about 1 year ago and twisted her left ankle in her heel has been painful really since then. Did have some swelling at the time and iced it for a few days. Did have bruising on the inside, scrap on the inside.   Was on her feet a lot     BP Readings from Last 3 Encounters:   21 134/76   19 (!) 122/94   19 126/88          (goal 120/80)    Wt Readings from Last 3 Encounters:   21 246 lb (111.6 kg)   19 235 lb (106.6 kg)   19 240 lb 11.2 oz (109.2 kg)        Past Medical History:   Diagnosis Date    Depression     Hyperlipidemia     Seasonal allergies       Past Surgical History:   Procedure Laterality Date     SECTION      COLONOSCOPY      19 at [facility] with Dr. Venu Correa, TOTAL ABDOMINAL  2013    fibroids    RI HALLUX RIGIDUS W/CHEILECTOMY 1ST MP JT W/O IMPLT Right 3/23/2018    Right 1st MPJ CHEILECTOMY performed by Jeffy Mcdonald DPM at [facility] 197 History   Problem Relation Age of Onset    Heart Disease Mother     Diabetes Mother     High Cholesterol Mother     Diabetes Father     Anxiety Disorder Daughter        Social History     Tobacco Use    Smoking status: Never Smoker    Smokeless tobacco: Never Used   Substance Use Topics    Alcohol use: No      Current Outpatient Medications   Medication Sig Dispense Refill    meloxicam (MOBIC) 15 MG tablet Take 1 tablet by mouth daily 90 tablet 1    citalopram (CELEXA) 20 MG tablet TAKE 2 TABLETS DAILY 180 tablet 3    Multiple Vitamins-Minerals (THERAPEUTIC MULTIVITAMIN-MINERALS) tablet Take 1 tablet by mouth daily      cetirizine (ZYRTEC) 10 MG tablet Take 10 mg by mouth daily       No current facility-administered medications for this visit. No Known Allergies    Health Maintenance   Topic Date Due    Hepatitis C screen  Never done    HIV screen  Never done    DTaP/Tdap/Td vaccine (1 - Tdap) Never done    Diabetes screen  Never done    Potassium monitoring  07/01/2015    Creatinine monitoring  07/01/2015    Breast cancer screen  12/03/2020    Flu vaccine (1) 09/01/2021    Lipid screen  02/11/2022    Colon cancer screen colonoscopy  01/04/2029    Shingles Vaccine  Completed    COVID-19 Vaccine  Completed    Hepatitis A vaccine  Aged Out    Hepatitis B vaccine  Aged Out    Hib vaccine  Aged Out    Meningococcal (ACWY) vaccine  Aged Out    Pneumococcal 0-64 years Vaccine  Aged Out       Subjective:      Review of Systems    Objective:     /76 (Site: Left Upper Arm, Position: Sitting, Cuff Size: Large Adult)   Pulse 73   Ht 5' 8\" (1.727 m)   Wt 246 lb (111.6 kg)   SpO2 98%   BMI 37.40 kg/m²     Physical Exam  Vitals and nursing note reviewed. Constitutional:       Appearance: She is well-developed. HENT:      Head: Normocephalic and atraumatic.       Right Ear: Tympanic membrane normal.      Left Ear: Tympanic membrane normal.      Nose: Nose normal. No congestion or rhinorrhea. Mouth/Throat:      Mouth: Mucous membranes are moist.   Eyes:      Conjunctiva/sclera: Conjunctivae normal.   Neck:      Thyroid: No thyromegaly. Vascular: No JVD. Cardiovascular:      Rate and Rhythm: Normal rate and regular rhythm. Heart sounds: No murmur heard. No friction rub. No gallop. Pulmonary:      Effort: Pulmonary effort is normal. No respiratory distress. Breath sounds: Normal breath sounds. Abdominal:      Palpations: Abdomen is soft. Musculoskeletal:      Cervical back: Normal range of motion and neck supple. Feet:    Lymphadenopathy:      Cervical: No cervical adenopathy. Skin:     General: Skin is warm. Neurological:      Mental Status: She is alert and oriented to person, place, and time. Psychiatric:         Mood and Affect: Mood normal.         Behavior: Behavior normal.         Thought Content: Thought content normal.         Judgment: Judgment normal.         Assessment/Plan:     1. Epistaxis  -     CBC Auto Differential; Future  2. Pain of left heel  -     meloxicam (MOBIC) 15 MG tablet; Take 1 tablet by mouth daily, Disp-90 tablet, R-1Normal  3. Screening mammogram, encounter for  -     [facility] EDEL DIGITAL SCREEN BILATERAL; Future  4. Screening, lipid  -     Lipid Panel; Future  5. Screening for diabetes mellitus  -     Comprehensive Metabolic Panel; Future    Nasal saline reuglarly throughout the day to prevent recurrent epistaxis. Check xray to rule out stress fracture-- heel gel insert in shoes. If negative then would refer to PT    Lab Results   Component Value Date    CHOL 209 02/11/2017    TRIG 245 02/11/2017    HDL 47 02/11/2017     07/01/2014    K 4.5 07/01/2014     07/01/2014    CREATININE 0.8 07/01/2014    BUN 16 07/01/2014    CO2 27 07/01/2014       Return if symptoms worsen or fail to improve.                 Patient given educational materials - see patientinstructions. Discussed use, benefit, and side effects of prescribed medications. All patient questions answered. Pt voiced understanding. Reviewed health maintenance. Instructed to continue current medications, diet andexercise. Patient agreed with treatment plan. Follow up as directed.      (Please note that portions of this note were completed with a voice-recognition program. Efforts were made to edit the dictation but occasionally words are mis-transcribed.)    Electronically signed by Martha Mckinley MD on 7/11/2021

## 2021-07-12 LAB
ALBUMIN/GLOBULIN RATIO: 1.7 G/DL
ALBUMIN: 4.1 G/DL (ref 3.5–5)
ALP BLD-CCNC: 85 UNITS/L (ref 38–126)
ALT SERPL-CCNC: 18 UNITS/L (ref 4–35)
ANION GAP SERPL CALCULATED.3IONS-SCNC: 8.5 MMOL/L
AST SERPL-CCNC: 18 UNITS/L (ref 14–36)
BASOPHILS %: 1.26 (ref 0–3)
BASOPHILS ABSOLUTE: 0.11 (ref 0–0.3)
BILIRUB SERPL-MCNC: 0.6 MG/DL (ref 0.2–1.3)
BUN BLDV-MCNC: 16 MG/DL (ref 7–17)
CALCIUM SERPL-MCNC: 9.3 MG/DL (ref 8.4–10.2)
CHLORIDE BLD-SCNC: 106 MMOL/L (ref 98–120)
CHOLESTEROL/HDL RATIO: 5.86 RATIO (ref 0–4.5)
CHOLESTEROL: 217 MG/DL (ref 50–200)
CO2: 26 MMOL/L (ref 22–31)
CREAT SERPL-MCNC: 0.7 MG/DL (ref 0.5–1)
EOSINOPHILS %: 1.79 (ref 0–10)
EOSINOPHILS ABSOLUTE: 0.15 (ref 0–1.1)
GFR CALCULATED: > 60
GLOBULIN: 2.5 G/DL
GLUCOSE: 114 MG/DL (ref 65–105)
HCT VFR BLD CALC: 44.2 % (ref 37–47)
HDLC SERPL-MCNC: 37 MG/DL (ref 36–68)
HEMOGLOBIN: 14.5 (ref 12–16)
LDL CHOLESTEROL CALCULATED: 120.8 MG/DL (ref 0–160)
LYMPHOCYTE %: 30.21 (ref 20–51.1)
LYMPHOCYTES ABSOLUTE: 2.57 (ref 1–5.5)
MCH RBC QN AUTO: 28.3 PG (ref 28.5–32.5)
MCHC RBC AUTO-ENTMCNC: 32.8 G/DL (ref 32–37)
MCV RBC AUTO: 86.2 FL (ref 80–94)
MONOCYTES %: 6.51 (ref 1.7–9.3)
MONOCYTES ABSOLUTE: 0.55 (ref 0.1–1)
NEUTROPHILS %: 60.23 (ref 42.2–75.2)
NEUTROPHILS ABSOLUTE: 5.13 (ref 2–8.1)
PDW BLD-RTO: 11.6 % (ref 8.5–15.5)
PLATELET # BLD: 266.8 THOU/MM3 (ref 130–400)
POTASSIUM SERPL-SCNC: 4.1 MMOL/L (ref 3.6–5)
RBC: 5.13 M/UL (ref 4.2–5.4)
SODIUM BLD-SCNC: 140 MMOL/L (ref 135–145)
TOTAL PROTEIN, SERUM: 6.6 G/DL (ref 6.3–8.2)
TRIGL SERPL-MCNC: 296 MG/DL (ref 10–250)
VLDLC SERPL CALC-MCNC: 59.2 MG/DL (ref 0–50)
WBC: 8.5 THOU/ML3 (ref 4.8–10.8)

## 2021-07-13 ENCOUNTER — TELEPHONE (OUTPATIENT)
Dept: FAMILY MEDICINE CLINIC | Age: 56
End: 2021-07-13

## 2021-07-13 NOTE — TELEPHONE ENCOUNTER
The 10-year ASCVD risk score (Ame Rosas, et al., 2013) is: 3.4%    Values used to calculate the score:      Age: 55 years      Sex: Female      Is Non- : No      Diabetic: No      Tobacco smoker: No      Systolic Blood Pressure: 016 mmHg      Is BP treated: No      HDL Cholesterol: 37 mg/dL      Total Cholesterol: 217 mg/dL    Labs show her blood sugar is in the prediabetic range and the triglycerides are elevated -- higher than previous. At this point I would not recommend medications but definitely need to continue to work on diet and exercise as every year has been higher and will soon be in the diabetic range. If would like a referral to dietician we can also set that up.  Dr. Justyn Mart

## 2021-12-19 DIAGNOSIS — F32.4 MAJOR DEPRESSIVE DISORDER WITH SINGLE EPISODE, IN PARTIAL REMISSION (HCC): ICD-10-CM

## 2021-12-20 RX ORDER — CITALOPRAM 20 MG/1
TABLET ORAL
Qty: 180 TABLET | Refills: 3 | Status: SHIPPED | OUTPATIENT
Start: 2021-12-20

## 2021-12-20 NOTE — TELEPHONE ENCOUNTER
Renetta Avendaño is requesting a refill on the following medication(s):  Requested Prescriptions     Pending Prescriptions Disp Refills    citalopram (CELEXA) 20 MG tablet [Pharmacy Med Name: CITALOPRAM HYDROBROMIDE TABS 20MG] 180 tablet 3     Sig: TAKE 2 TABLETS DAILY       Last Visit Date (If Applicable):  9/2/9423    Next Visit Date:    Visit date not found

## 2022-03-22 LAB
FLU A ANTIGEN: NEGATIVE
FLU B ANTIGEN: NEGATIVE
STREP A DIRECT SCREEN: NEGATIVE

## 2022-09-11 LAB
ANION GAP SERPL CALCULATED.3IONS-SCNC: 13.5 MMOL/L
BANDED NEUTROPHILS RELATIVE PERCENT: 0 % (ref 0–5)
BASOPHILS %. MANUAL COUNT: 0 % (ref 0–1)
BUN BLDV-MCNC: 17 MG/DL (ref 7–17)
CALCIUM SERPL-MCNC: 9.1 MG/DL (ref 8.4–10.2)
CHLORIDE BLD-SCNC: 111 MMOL/L (ref 98–120)
CO2: 21 MMOL/L (ref 22–31)
CREAT SERPL-MCNC: 0.6 MG/DL (ref 0.5–1)
EOSINOPHILS % MANUAL COUNT: 0 (ref 0–10)
GFR CALCULATED: > 60
GLUCOSE: 250 MG/DL (ref 65–105)
HCT VFR BLD CALC: 49.6 % (ref 37–47)
HEMOGLOBIN: 15.3 (ref 12–16)
LYMPHOCYTES % MANUAL COUNT: 11 % (ref 21–51)
MCH RBC QN AUTO: 28 PG (ref 28.5–32.5)
MCHC RBC AUTO-ENTMCNC: 30.8 G/DL (ref 32–37)
MCV RBC AUTO: 90.8 FL (ref 80–94)
MONOCYTES RELATIVE PERCENT: 2 % (ref 2–9)
NEUTROPHILS %. MANUAL COUNT: 87 % (ref 42–75)
PDW BLD-RTO: 12.2 % (ref 8.5–15.5)
PLATELET # BLD: 321.7 THOU/MM3 (ref 130–400)
POTASSIUM SERPL-SCNC: 3.5 MMOL/L (ref 3.6–5)
RBC: 5.47 M/UL (ref 4.2–5.4)
SODIUM BLD-SCNC: 142 MMOL/L (ref 135–145)
WBC: 11.8 THOU/ML3 (ref 4.8–10.8)

## 2022-09-22 ENCOUNTER — OFFICE VISIT (OUTPATIENT)
Dept: FAMILY MEDICINE CLINIC | Age: 57
End: 2022-09-22
Payer: COMMERCIAL

## 2022-09-22 VITALS
HEART RATE: 82 BPM | DIASTOLIC BLOOD PRESSURE: 70 MMHG | OXYGEN SATURATION: 98 % | HEIGHT: 68 IN | SYSTOLIC BLOOD PRESSURE: 112 MMHG | BODY MASS INDEX: 36.37 KG/M2 | WEIGHT: 240 LBS

## 2022-09-22 DIAGNOSIS — T78.2XXA ANAPHYLAXIS, INITIAL ENCOUNTER: Primary | ICD-10-CM

## 2022-09-22 PROCEDURE — 99214 OFFICE O/P EST MOD 30 MIN: CPT | Performed by: FAMILY MEDICINE

## 2022-09-22 RX ORDER — EPINEPHRINE 0.3 MG/.3ML
INJECTION SUBCUTANEOUS
Qty: 1 EACH | Refills: 1 | Status: SHIPPED | OUTPATIENT
Start: 2022-09-22

## 2022-09-22 SDOH — ECONOMIC STABILITY: FOOD INSECURITY: WITHIN THE PAST 12 MONTHS, YOU WORRIED THAT YOUR FOOD WOULD RUN OUT BEFORE YOU GOT MONEY TO BUY MORE.: NEVER TRUE

## 2022-09-22 SDOH — ECONOMIC STABILITY: FOOD INSECURITY: WITHIN THE PAST 12 MONTHS, THE FOOD YOU BOUGHT JUST DIDN'T LAST AND YOU DIDN'T HAVE MONEY TO GET MORE.: NEVER TRUE

## 2022-09-22 ASSESSMENT — PATIENT HEALTH QUESTIONNAIRE - PHQ9
SUM OF ALL RESPONSES TO PHQ QUESTIONS 1-9: 1
9. THOUGHTS THAT YOU WOULD BE BETTER OFF DEAD, OR OF HURTING YOURSELF: 0
2. FEELING DOWN, DEPRESSED OR HOPELESS: 0
1. LITTLE INTEREST OR PLEASURE IN DOING THINGS: 0
6. FEELING BAD ABOUT YOURSELF - OR THAT YOU ARE A FAILURE OR HAVE LET YOURSELF OR YOUR FAMILY DOWN: 0
3. TROUBLE FALLING OR STAYING ASLEEP: 0
10. IF YOU CHECKED OFF ANY PROBLEMS, HOW DIFFICULT HAVE THESE PROBLEMS MADE IT FOR YOU TO DO YOUR WORK, TAKE CARE OF THINGS AT HOME, OR GET ALONG WITH OTHER PEOPLE: 0
7. TROUBLE CONCENTRATING ON THINGS, SUCH AS READING THE NEWSPAPER OR WATCHING TELEVISION: 0
SUM OF ALL RESPONSES TO PHQ9 QUESTIONS 1 & 2: 0
SUM OF ALL RESPONSES TO PHQ QUESTIONS 1-9: 1
4. FEELING TIRED OR HAVING LITTLE ENERGY: 1
5. POOR APPETITE OR OVEREATING: 0
SUM OF ALL RESPONSES TO PHQ QUESTIONS 1-9: 1
8. MOVING OR SPEAKING SO SLOWLY THAT OTHER PEOPLE COULD HAVE NOTICED. OR THE OPPOSITE, BEING SO FIGETY OR RESTLESS THAT YOU HAVE BEEN MOVING AROUND A LOT MORE THAN USUAL: 0
SUM OF ALL RESPONSES TO PHQ QUESTIONS 1-9: 1

## 2022-09-22 ASSESSMENT — SOCIAL DETERMINANTS OF HEALTH (SDOH): HOW HARD IS IT FOR YOU TO PAY FOR THE VERY BASICS LIKE FOOD, HOUSING, MEDICAL CARE, AND HEATING?: NOT HARD AT ALL

## 2022-09-22 NOTE — PROGRESS NOTES
[facility]  [address]  Dept: [phone]  Dept OZF:[phone]    Denita Alvarado is a 65 y.o. female who presents today for her medical conditions/complaints as notedbelow. Denita Alvarado is c/o of ED Follow-up [facility] ER 9/11/22  - rash/hives/welts and itching all over body, swelling in her face)        Assessment/Plan:     1. Anaphylaxis, initial encounter  -     EPINEPHrine (EPIPEN 2-ALANNAH) 0.3 MG/0.3ML SOAJ injection; Use as directed for allergic reaction. , Disp-1 each, R-1Normal    Discussed etiology of anaphylaxis. Discussed EpiPen's and time to use it. Discussed and mild rash and similar reactions to use oral cetirizine Benadryl or similar. Reviewed the signs and symptoms of anaphylaxis with appropriate use of EpiPen and ER visit that would be required afterwards. Lab Results   Component Value Date    WBC 11.8 (H) 09/11/2022    HGB 15.3 09/11/2022    HCT 49.6 (H) 09/11/2022    .7 09/11/2022    CHOL 217 (H) 07/12/2021    TRIG 296 (H) 07/12/2021    HDL 37 07/12/2021    ALT 18 07/12/2021    AST 18 07/12/2021     09/11/2022    K 3.5 (L) 09/11/2022     09/11/2022    CREATININE 0.6 09/11/2022    BUN 17 09/11/2022    CO2 21 (L) 09/11/2022       No follow-ups on file. Subjective:      HPI:     HPI    Had been out picking up buckeyes in the morning-some of the blockers had been broken open and had oil from the inside on the outside gotten on her hands. And then in the afternoon went clothes shopping in the afternoon. That evening she had a rash all over-- really bad in the armpits and the chest -- down to her groin. Then she started having swelling in her face and even trouble swallowing her spit. Rash has completely resolved-- no further issues. Rash was better by Tuesday/ weds. No other history of reactions.      BP Readings from Last 3 Encounters:   09/22/22 112/70   07/08/21 134/76 19 (!) 122/94          (goal 120/80)    Wt Readings from Last 3 Encounters:   22 240 lb (108.9 kg)   21 246 lb (111.6 kg)   19 235 lb (106.6 kg)        Past Medical History:   Diagnosis Date    Depression     Hyperlipidemia     Seasonal allergies       Past Surgical History:   Procedure Laterality Date     SECTION      COLONOSCOPY      19 at [facility] with Dr. Charlotte Brown, TOTAL ABDOMINAL (CERVIX REMOVED)  2013    fibroids    [place] HALLUX RIGIDUS W/CHEILECTOMY 1ST MP JT W/O IMPLT Right 3/23/2018    Right 1st MPJ CHEILECTOMY performed by Cali Wilson DPM at [place] [address] History   Problem Relation Age of Onset    Heart Disease Mother     Diabetes Mother     High Cholesterol Mother     Diabetes Father     Anxiety Disorder Daughter        Social History     Tobacco Use    Smoking status: Never    Smokeless tobacco: Never   Substance Use Topics    Alcohol use: No      Current Outpatient Medications   Medication Sig Dispense Refill    EPINEPHrine (EPIPEN 2-ALANNAH) 0.3 MG/0.3ML SOAJ injection Use as directed for allergic reaction. 1 each 1    citalopram (CELEXA) 20 MG tablet TAKE 2 TABLETS DAILY 180 tablet 3    Multiple Vitamins-Minerals (THERAPEUTIC MULTIVITAMIN-MINERALS) tablet Take 1 tablet by mouth daily      cetirizine (ZYRTEC) 10 MG tablet Take 10 mg by mouth daily       No current facility-administered medications for this visit.      No Known Allergies    Health Maintenance   Topic Date Due    HIV screen  Never done    Hepatitis C screen  Never done    DTaP/Tdap/Td vaccine (1 - Tdap) Never done    Diabetes screen  Never done    Cervical cancer screen  2016    Breast cancer screen  2023    Depression Monitoring  2023    Lipids  2026    Colorectal Cancer Screen  2029    Flu vaccine  Completed    Shingles vaccine  Completed    COVID-19 Vaccine  Completed    Hepatitis A vaccine  Aged Out    Hepatitis B vaccine  Aged Out    Hib vaccine  Aged Out    Meningococcal (ACWY) vaccine  Aged Out    Pneumococcal 0-64 years Vaccine  Aged Out         Review of Systems    Objective:     /70 (Site: Right Upper Arm, Position: Sitting, Cuff Size: Large Adult)   Pulse 82   Ht 5' 8\" (1.727 m)   Wt 240 lb (108.9 kg)   SpO2 98%   BMI 36.49 kg/m²     Physical Exam  Vitals and nursing note reviewed. Constitutional:       Appearance: Normal appearance. She is well-developed. HENT:      Head: Normocephalic and atraumatic. Right Ear: External ear normal.      Left Ear: External ear normal.   Eyes:      Extraocular Movements: Extraocular movements intact. Conjunctiva/sclera: Conjunctivae normal.   Neck:      Thyroid: No thyromegaly. Vascular: No JVD. Cardiovascular:      Rate and Rhythm: Normal rate and regular rhythm. Pulses: Normal pulses. Heart sounds: Normal heart sounds. No murmur heard. No friction rub. No gallop. Pulmonary:      Effort: Pulmonary effort is normal. No respiratory distress. Breath sounds: Normal breath sounds. Musculoskeletal:      Cervical back: Normal range of motion and neck supple. Right lower leg: No edema. Left lower leg: No edema. Lymphadenopathy:      Cervical: No cervical adenopathy. Skin:     General: Skin is warm. Capillary Refill: Capillary refill takes less than 2 seconds. Findings: No rash. Neurological:      General: No focal deficit present. Mental Status: She is alert and oriented to person, place, and time. Psychiatric:         Mood and Affect: Mood normal.         Behavior: Behavior normal.         Thought Content: Thought content normal.         Judgment: Judgment normal.             Multiple labs and other testing may have been ordered which may not be completely evident from the above note due to system interface incompatibilities. Patient given educational materials - see patientinstructions.   Discussed use, benefit, and side effects of prescribed medications. All patient questions answered. Pt voiced understanding. Reviewed health maintenance. Instructed to continue current medications, diet andexercise. Patient agreed with treatment plan. Follow up as directed.      (Please note that portions of this note were completed with a voice-recognition program. Efforts were made to edit the dictation but occasionally words are mis-transcribed.)    Electronically signed by Dean Abraham MD on 10/2/2022

## 2022-12-02 ENCOUNTER — OFFICE VISIT (OUTPATIENT)
Dept: FAMILY MEDICINE CLINIC | Age: 57
End: 2022-12-02
Payer: COMMERCIAL

## 2022-12-02 VITALS
BODY MASS INDEX: 37.4 KG/M2 | HEART RATE: 70 BPM | OXYGEN SATURATION: 96 % | DIASTOLIC BLOOD PRESSURE: 80 MMHG | WEIGHT: 246 LBS | SYSTOLIC BLOOD PRESSURE: 124 MMHG

## 2022-12-02 DIAGNOSIS — R07.89 ATYPICAL CHEST PAIN: ICD-10-CM

## 2022-12-02 DIAGNOSIS — R40.0 DAYTIME SLEEPINESS: ICD-10-CM

## 2022-12-02 DIAGNOSIS — R06.83 SNORING: ICD-10-CM

## 2022-12-02 DIAGNOSIS — Z13.1 SCREENING FOR DIABETES MELLITUS: ICD-10-CM

## 2022-12-02 DIAGNOSIS — Z13.220 SCREENING, LIPID: ICD-10-CM

## 2022-12-02 DIAGNOSIS — F41.8 DEPRESSION WITH ANXIETY: Primary | ICD-10-CM

## 2022-12-02 PROCEDURE — 99214 OFFICE O/P EST MOD 30 MIN: CPT | Performed by: FAMILY MEDICINE

## 2022-12-02 PROCEDURE — 3078F DIAST BP <80 MM HG: CPT | Performed by: FAMILY MEDICINE

## 2022-12-02 PROCEDURE — 3074F SYST BP LT 130 MM HG: CPT | Performed by: FAMILY MEDICINE

## 2022-12-02 RX ORDER — NITROGLYCERIN 0.4 MG/1
TABLET SUBLINGUAL
COMMUNITY
Start: 2022-11-17

## 2022-12-02 RX ORDER — ESCITALOPRAM OXALATE 20 MG/1
20 TABLET ORAL DAILY
Qty: 30 TABLET | Refills: 1 | Status: SHIPPED | OUTPATIENT
Start: 2022-12-02

## 2022-12-02 RX ORDER — ASPIRIN 81 MG/1
81 TABLET ORAL DAILY
COMMUNITY

## 2022-12-02 ASSESSMENT — SLEEP AND FATIGUE QUESTIONNAIRES
HOW LIKELY ARE YOU TO NOD OFF OR FALL ASLEEP WHILE SITTING AND TALKING TO SOMEONE: 0
HOW LIKELY ARE YOU TO NOD OFF OR FALL ASLEEP WHILE WATCHING TV: 3
HOW LIKELY ARE YOU TO NOD OFF OR FALL ASLEEP IN A CAR, WHILE STOPPED FOR A FEW MINUTES IN TRAFFIC: 0
NECK CIRCUMFERENCE (INCHES): 17
HOW LIKELY ARE YOU TO NOD OFF OR FALL ASLEEP WHILE SITTING QUIETLY AFTER LUNCH WITHOUT ALCOHOL: 0
HOW LIKELY ARE YOU TO NOD OFF OR FALL ASLEEP WHILE SITTING INACTIVE IN A PUBLIC PLACE: 0
ESS TOTAL SCORE: 11
HOW LIKELY ARE YOU TO NOD OFF OR FALL ASLEEP WHILE LYING DOWN TO REST IN THE AFTERNOON WHEN CIRCUMSTANCES PERMIT: 3
HOW LIKELY ARE YOU TO NOD OFF OR FALL ASLEEP WHILE SITTING AND READING: 2
HOW LIKELY ARE YOU TO NOD OFF OR FALL ASLEEP WHEN YOU ARE A PASSENGER IN A CAR FOR AN HOUR WITHOUT A BREAK: 3

## 2022-12-02 NOTE — PATIENT INSTRUCTIONS
Try the topical voltaren gel at bedtime for the left shoulder-- could try oral ibuprofen or aleve at bedtime for a few weeks also. HEP for the shoulder    Counseling and change the citalopram to the escitalopram.  Would consider wellbutrin in addition if not seeing improvement. Referral for a sleep study.

## 2022-12-02 NOTE — PROGRESS NOTES
[facility]  [address]  Dept: [phone]  Dept TFF:[phone]    Abdoul Posadas is a 65 y.o. female who presents today for her medical conditions/complaints as notedbelow. Abdoul Posadas is c/o of ED Follow-up (Chest tightness. Had stress test completed on 11/29. Today reports having tightness in chest and left arm pain, denies sob)        Assessment/Plan:     1. Depression with anxiety  2. Daytime sleepiness  -     Baseline Diagnostic Sleep Study; Future  3. Snoring  -     Baseline Diagnostic Sleep Study; Future  4. Screening for diabetes mellitus  -     Glucose, Fasting; Future  5. Screening, lipid  -     Lipid Panel; Future  6. Atypical chest pain  -     Baseline Diagnostic Sleep Study; Future    Try the topical voltaren gel at bedtime for the left shoulder-- could try oral ibuprofen or aleve at bedtime for a few weeks also. HEP for the shoulder    Counseling and change the citalopram to the escitalopram.  Would consider wellbutrin in addition if not seeing improvement. Referral for a sleep study. Lab Results   Component Value Date    WBC 8.7 11/17/2022    HGB 14.5 11/17/2022    HCT 46.3 11/17/2022    .1 11/17/2022    CHOL 217 (H) 07/12/2021    TRIG 296 (H) 07/12/2021    HDL 37 07/12/2021    ALT 21 11/17/2022    AST 18 11/17/2022     11/17/2022    K 3.8 11/17/2022     11/17/2022    CREATININE 0.7 11/17/2022    BUN 18 (H) 11/17/2022    CO2 25 11/17/2022       Return in about 1 month (around 1/5/2023). Subjective:      HPI:     HPI    Has been feeling pressure in her left chest and into her left arm -- sometimes into her right arm-- almost every time when she wakes up. When she went to the ER she was really SOB. Is under a lot of stress. The pressure on her chest was crushing. Not shooting pain at all. Into her left neck as well. Very tired also. Arms are slightly tender.   Has had the chest pain intermittently or the last 3-4 weeks. Not really pain just pressure. Nothing makes it better or worse really. Almost daily for the initial 2 weeks a little less frequent since then. Any time of day-- even just sitting a her desk-- has woken up once at night with it. Was able to deep breaths and it went away. Lasts 1-2 minutes. No nausea. No palpitations. Is occasionally SOB with it. Stopped drinking caffeine and this seems to have helped her caffeine. Had been drinking a lot of mountain dew daily. Has been sad but thinks it is because she has been more worried. Feels mentally and emotionally drained. Cannot retire for awhile yet. Is getting enough sleep. If she dreams she does sometimes remember them. Usually wakes up feeling rested. Does yawn some in the daytime. Does snore. Has been told she startles awake.     Sleep Medicine 2022   Sitting and reading 2   Watching TV 3   Sitting, inactive in a public place (e.g. a theatre or a meeting) 0   As a passenger in a car for an hour without a break 3   Lying down to rest in the afternoon when circumstances permit 3   Sitting and talking to someone 0   Sitting quietly after a lunch without alcohol 0   In a car, while stopped for a few minutes in traffic 0   [place] Sleepiness Score 11   Neck circumference (Inches) 17         BP Readings from Last 3 Encounters:   22 124/80   22 112/70   21 134/76          (goal 120/80)    Wt Readings from Last 3 Encounters:   22 246 lb (111.6 kg)   22 240 lb (108.9 kg)   21 246 lb (111.6 kg)        Past Medical History:   Diagnosis Date    Depression     Hyperlipidemia     Seasonal allergies       Past Surgical History:   Procedure Laterality Date     SECTION      COLONOSCOPY      19 at [facility] with Dr. Jeannie Hudson, [address] (CERVIX REMOVED)  2013    fibroids    [place] HALLUX RIGIDUS W/CHEILECTOMY 1ST MP JT W/O IMPLT Right 3/23/2018    Right 1st MPJ CHEILECTOMY performed by Susanne Galeano DPM at [facility] OR       Family History   Problem Relation Age of Onset    Heart Disease Mother     Diabetes Mother     High Cholesterol Mother     Diabetes Father     Anxiety Disorder Daughter        Social History     Tobacco Use    Smoking status: Never    Smokeless tobacco: Never   Substance Use Topics    Alcohol use: No      Current Outpatient Medications   Medication Sig Dispense Refill    nitroGLYCERIN (NITROSTAT) 0.4 MG SL tablet place 1 tablet under the tongue if needed every 5 TO 15 MINUTES f...  (REFER TO PRESCRIPTION NOTES). aspirin 81 MG EC tablet Take 81 mg by mouth daily      escitalopram (LEXAPRO) 20 MG tablet Take 1 tablet by mouth daily 30 tablet 1    EPINEPHrine (EPIPEN 2-ALANNAH) 0.3 MG/0.3ML SOAJ injection Use as directed for allergic reaction. 1 each 1    Multiple Vitamins-Minerals (THERAPEUTIC MULTIVITAMIN-MINERALS) tablet Take 1 tablet by mouth daily      cetirizine (ZYRTEC) 10 MG tablet Take 10 mg by mouth daily       No current facility-administered medications for this visit. No Known Allergies    Health Maintenance   Topic Date Due    HIV screen  Never done    Hepatitis C screen  Never done    DTaP/Tdap/Td vaccine (1 - Tdap) Never done    Diabetes screen  Never done    COVID-19 Vaccine (5 - Booster for Moderna series) 09/23/2022    Breast cancer screen  07/18/2023    Depression Monitoring  09/22/2023    Lipids  07/12/2026    Colorectal Cancer Screen  01/04/2029    Flu vaccine  Completed    Shingles vaccine  Completed    Hepatitis A vaccine  Aged Out    Hib vaccine  Aged Out    Meningococcal (ACWY) vaccine  Aged Out    Pneumococcal 0-64 years Vaccine  Aged Out         Review of Systems    Objective:     /80   Pulse 70   Wt 246 lb (111.6 kg)   SpO2 96%   BMI 37.40 kg/m²     Physical Exam  Vitals and nursing note reviewed. Constitutional:       Appearance: Normal appearance. She is well-developed.    HENT:      Head: Normocephalic and atraumatic. Right Ear: External ear normal.      Left Ear: External ear normal.   Eyes:      Extraocular Movements: Extraocular movements intact. Conjunctiva/sclera: Conjunctivae normal.   Neck:      Thyroid: No thyromegaly. Vascular: No JVD. Cardiovascular:      Rate and Rhythm: Normal rate and regular rhythm. Pulses: Normal pulses. Heart sounds: Normal heart sounds. No murmur heard. No friction rub. No gallop. Pulmonary:      Effort: Pulmonary effort is normal. No respiratory distress. Breath sounds: Normal breath sounds. Musculoskeletal:      Cervical back: Normal range of motion and neck supple. Right lower leg: No edema. Left lower leg: No edema. Lymphadenopathy:      Cervical: No cervical adenopathy. Skin:     General: Skin is warm. Capillary Refill: Capillary refill takes less than 2 seconds. Neurological:      General: No focal deficit present. Mental Status: She is alert and oriented to person, place, and time. Psychiatric:         Mood and Affect: Mood normal.         Behavior: Behavior normal.         Thought Content: Thought content normal.         Judgment: Judgment normal.             Multiple labs and other testing may have been ordered which may not be completely evident from the above note due to system interface incompatibilities. Patient given educational materials - see patientinstructions. Discussed use, benefit, and side effects of prescribed medications. All patient questions answered. Pt voiced understanding. Reviewed health maintenance. Instructed to continue current medications, diet andexercise. Patient agreed with treatment plan. Follow up as directed.      (Please note that portions of this note were completed with a voice-recognition program. Efforts were made to edit the dictation but occasionally words are mis-transcribed.)    Electronically signed by Chris Quarles MD on 12/10/2022

## 2023-01-05 ENCOUNTER — OFFICE VISIT (OUTPATIENT)
Dept: FAMILY MEDICINE CLINIC | Age: 58
End: 2023-01-05
Payer: COMMERCIAL

## 2023-01-05 VITALS
BODY MASS INDEX: 36.8 KG/M2 | OXYGEN SATURATION: 98 % | DIASTOLIC BLOOD PRESSURE: 76 MMHG | SYSTOLIC BLOOD PRESSURE: 134 MMHG | WEIGHT: 242 LBS | HEART RATE: 72 BPM

## 2023-01-05 DIAGNOSIS — F32.4 MAJOR DEPRESSIVE DISORDER WITH SINGLE EPISODE, IN PARTIAL REMISSION (HCC): Primary | ICD-10-CM

## 2023-01-05 DIAGNOSIS — Z23 NEED FOR DIPHTHERIA-TETANUS-PERTUSSIS (TDAP) VACCINE: ICD-10-CM

## 2023-01-05 PROCEDURE — 3074F SYST BP LT 130 MM HG: CPT | Performed by: FAMILY MEDICINE

## 2023-01-05 PROCEDURE — 3078F DIAST BP <80 MM HG: CPT | Performed by: FAMILY MEDICINE

## 2023-01-05 PROCEDURE — 90715 TDAP VACCINE 7 YRS/> IM: CPT | Performed by: FAMILY MEDICINE

## 2023-01-05 PROCEDURE — 99213 OFFICE O/P EST LOW 20 MIN: CPT | Performed by: FAMILY MEDICINE

## 2023-01-05 PROCEDURE — 90471 IMMUNIZATION ADMIN: CPT | Performed by: FAMILY MEDICINE

## 2023-01-05 RX ORDER — ESCITALOPRAM OXALATE 20 MG/1
20 TABLET ORAL DAILY
Qty: 90 TABLET | Refills: 3 | Status: SHIPPED | OUTPATIENT
Start: 2023-01-05

## 2023-01-05 RX ORDER — ESCITALOPRAM OXALATE 20 MG/1
TABLET ORAL
Qty: 30 TABLET | Refills: 1 | OUTPATIENT
Start: 2023-01-05

## 2023-01-05 ASSESSMENT — PATIENT HEALTH QUESTIONNAIRE - PHQ9
SUM OF ALL RESPONSES TO PHQ QUESTIONS 1-9: 0
5. POOR APPETITE OR OVEREATING: 0
SUM OF ALL RESPONSES TO PHQ QUESTIONS 1-9: 0
SUM OF ALL RESPONSES TO PHQ9 QUESTIONS 1 & 2: 0
1. LITTLE INTEREST OR PLEASURE IN DOING THINGS: 0
6. FEELING BAD ABOUT YOURSELF - OR THAT YOU ARE A FAILURE OR HAVE LET YOURSELF OR YOUR FAMILY DOWN: 0
SUM OF ALL RESPONSES TO PHQ QUESTIONS 1-9: 0
9. THOUGHTS THAT YOU WOULD BE BETTER OFF DEAD, OR OF HURTING YOURSELF: 0
4. FEELING TIRED OR HAVING LITTLE ENERGY: 0
7. TROUBLE CONCENTRATING ON THINGS, SUCH AS READING THE NEWSPAPER OR WATCHING TELEVISION: 0
3. TROUBLE FALLING OR STAYING ASLEEP: 0
2. FEELING DOWN, DEPRESSED OR HOPELESS: 0
10. IF YOU CHECKED OFF ANY PROBLEMS, HOW DIFFICULT HAVE THESE PROBLEMS MADE IT FOR YOU TO DO YOUR WORK, TAKE CARE OF THINGS AT HOME, OR GET ALONG WITH OTHER PEOPLE: 0
8. MOVING OR SPEAKING SO SLOWLY THAT OTHER PEOPLE COULD HAVE NOTICED. OR THE OPPOSITE, BEING SO FIGETY OR RESTLESS THAT YOU HAVE BEEN MOVING AROUND A LOT MORE THAN USUAL: 0
SUM OF ALL RESPONSES TO PHQ QUESTIONS 1-9: 0

## 2023-01-05 NOTE — PROGRESS NOTES
1200 [facility]  [address]  Dept: [phone]  Dept IW:[phone]    Bethany Barragan is a 65 y.o. female who presents today for her medical conditions/complaints as notedbelow. Bethany Barragan is c/o of Depression (1mo follow up - she does not notice any difference with change from citalopram to escitalopram. )      Assessment/Plan:     1. Major depressive disorder with single episode, in partial remission (HCC)  -     escitalopram (LEXAPRO) 20 MG tablet; Take 1 tablet by mouth daily, Disp-90 tablet, R-3Normal  2. Need for diphtheria-tetanus-pertussis (Tdap) vaccine  -     Tdap, BOOSTRIX, (age 8 yrs+), IM    ImProving. Difficult to say if related to the change in medication the time off or support from friends. We will leave the Lexapro at the current dosage. Consider formal counseling if mood is not improving or addition of Wellbutrin as previously considered. Lab Results   Component Value Date    WBC 8.7 11/17/2022    HGB 14.5 11/17/2022    HCT 46.3 11/17/2022    .1 11/17/2022    CHOL 217 (H) 07/12/2021    TRIG 296 (H) 07/12/2021    HDL 37 07/12/2021    ALT 21 11/17/2022    AST 18 11/17/2022     11/17/2022    K 3.8 11/17/2022     11/17/2022    CREATININE 0.7 11/17/2022    BUN 18 (H) 11/17/2022    CO2 25 11/17/2022       Return in about 6 months (around 7/5/2023) for Medication recheck. Subjective:      HPI:     HPI    Got a lot of sleep over the holidays. Has cut out the caffeine and has not had as much difficulty sleeping. Will occasionally have 1 mountain dew but not 3 per day like she was taking. No shaking or jittery. Also thinks having the break from school has also helped. No palpitations or tightness or crushing feeling since she has been back.   Usually walks 8-10,000 steps per day    BP Readings from Last 3 Encounters:   01/05/23 134/76   12/02/22 124/80   09/22/22 112/70          (goal 120/80)    Wt Readings from Last 3 Encounters:   23 242 lb (109.8 kg)   22 246 lb (111.6 kg)   22 240 lb (108.9 kg)        Past Medical History:   Diagnosis Date    Depression     Hyperlipidemia     Seasonal allergies       Past Surgical History:   Procedure Laterality Date     SECTION      COLONOSCOPY      19 at [facility] with Dr. Sapphire Pelaez, TOTAL ABDOMINAL (CERVIX REMOVED)  2013    fibroids    [place] HALLUX RIGIDUS W/CHEILECTOMY 1ST MP JT W/O IMPLT Right 3/23/2018    Right 1st MPJ CHEILECTOMY performed by Jarocho Coulter DPM at [place] [address] History   Problem Relation Age of Onset    Heart Disease Mother     Diabetes Mother     High Cholesterol Mother     Diabetes Father     Anxiety Disorder Daughter        Social History     Tobacco Use    Smoking status: Never    Smokeless tobacco: Never   Substance Use Topics    Alcohol use: No      Current Outpatient Medications   Medication Sig Dispense Refill    escitalopram (LEXAPRO) 20 MG tablet Take 1 tablet by mouth daily 90 tablet 3    nitroGLYCERIN (NITROSTAT) 0.4 MG SL tablet place 1 tablet under the tongue if needed every 5 TO 15 MINUTES f...  (REFER TO PRESCRIPTION NOTES). aspirin 81 MG EC tablet Take 81 mg by mouth daily      EPINEPHrine (EPIPEN 2-ALANNAH) 0.3 MG/0.3ML SOAJ injection Use as directed for allergic reaction. 1 each 1    Multiple Vitamins-Minerals (THERAPEUTIC MULTIVITAMIN-MINERALS) tablet Take 1 tablet by mouth daily      cetirizine (ZYRTEC) 10 MG tablet Take 10 mg by mouth daily       No current facility-administered medications for this visit.      No Known Allergies    Health Maintenance   Topic Date Due    HIV screen  Never done    Hepatitis C screen  Never done    Diabetes screen  Never done    COVID-19 Vaccine (5 - Booster for Clarene Yanira series) 2022    Breast cancer screen  2023    Depression Monitoring  2024    Lipids  2026    Colorectal Cancer Screen  2029    DTaP/Tdap/Td vaccine (2 - Td or Tdap) 01/05/2033    Flu vaccine  Completed    Shingles vaccine  Completed    Hepatitis A vaccine  Aged Out    Hib vaccine  Aged Out    Meningococcal (ACWY) vaccine  Aged Out    Pneumococcal 0-64 years Vaccine  Aged Out         Review of Systems    Objective:     /76 (Site: Left Upper Arm, Position: Sitting, Cuff Size: Large Adult)   Pulse 72   Wt 242 lb (109.8 kg)   SpO2 98%   BMI 36.80 kg/m²     Physical Exam  Vitals and nursing note reviewed. Constitutional:       Appearance: Normal appearance. HENT:      Head: Normocephalic. Mouth/Throat:      Mouth: Mucous membranes are moist.   Cardiovascular:      Rate and Rhythm: Normal rate. Pulmonary:      Effort: Pulmonary effort is normal.   Musculoskeletal:      Cervical back: Neck supple. Right lower leg: No edema. Left lower leg: No edema. Neurological:      General: No focal deficit present. Mental Status: She is alert. Psychiatric:         Mood and Affect: Mood normal.         Behavior: Behavior normal.         Thought Content: Thought content normal.         Judgment: Judgment normal.             Multiple labs and other testing may have been ordered which may not be completely evident from the above note due to system interface incompatibilities. Patient given educational materials - see patientinstructions. Discussed use, benefit, and side effects of prescribed medications. All patient questions answered. Pt voiced understanding. Reviewed health maintenance. Instructed to continue current medications, diet andexercise. Patient agreed with treatment plan. Follow up as directed.      (Please note that portions of this note were completed with a voice-recognition program. Efforts were made to edit the dictation but occasionally words are mis-transcribed.)    Electronically signed by Davie Burrell MD on 1/8/2023

## 2023-01-05 NOTE — PROGRESS NOTES
After obtaining consent, and per orders of Dr. Sheree Jamison, injection of TDap given in Right deltoid by Abdias Long LPN. Patient tolerated well. Patient instructed to remain in clinic for 20 minutes afterwards, and to report any adverse reaction immediately.

## 2023-02-28 DIAGNOSIS — R06.83 SNORING: ICD-10-CM

## 2023-02-28 DIAGNOSIS — R07.89 ATYPICAL CHEST PAIN: ICD-10-CM

## 2023-02-28 DIAGNOSIS — R40.0 DAYTIME SLEEPINESS: ICD-10-CM

## 2023-03-04 LAB
CHOLESTEROL/HDL RATIO: 6.19 RATIO (ref 0–4.5)
CHOLESTEROL: 229 MG/DL (ref 50–200)
GLUCOSE: 115 MG/DL (ref 65–105)
HDLC SERPL-MCNC: 37 MG/DL (ref 36–68)
LDL CHOLESTEROL CALCULATED: 147.4 MG/DL (ref 0–160)
TRIGL SERPL-MCNC: 223 MG/DL (ref 10–250)
VLDLC SERPL CALC-MCNC: 45 MG/DL (ref 0–50)

## 2023-03-05 ENCOUNTER — TELEPHONE (OUTPATIENT)
Dept: FAMILY MEDICINE CLINIC | Age: 58
End: 2023-03-05

## 2023-03-05 DIAGNOSIS — G47.33 MODERATE OBSTRUCTIVE SLEEP APNEA: Primary | ICD-10-CM

## 2023-03-09 ENCOUNTER — TELEPHONE (OUTPATIENT)
Dept: FAMILY MEDICINE CLINIC | Age: 58
End: 2023-03-09

## 2024-01-05 DIAGNOSIS — F32.4 MAJOR DEPRESSIVE DISORDER WITH SINGLE EPISODE, IN PARTIAL REMISSION (HCC): ICD-10-CM

## 2024-01-08 RX ORDER — ESCITALOPRAM OXALATE 20 MG/1
20 TABLET ORAL DAILY
Qty: 30 TABLET | Refills: 0 | Status: SHIPPED | OUTPATIENT
Start: 2024-01-08

## 2024-01-08 NOTE — TELEPHONE ENCOUNTER
Yesica Thomas is requesting a refill on the following medication(s):  Requested Prescriptions     Pending Prescriptions Disp Refills    escitalopram (LEXAPRO) 20 MG tablet [Pharmacy Med Name: ESCITALOPRAM 20 MG TABLET] 90 tablet 3     Sig: take 1 tablet by mouth once daily       Last Visit Date (If Applicable):  1/5/2023    Next Visit Date:    Visit date not found    Message left to notify patient to schedule appointment.

## 2024-02-03 DIAGNOSIS — F32.4 MAJOR DEPRESSIVE DISORDER WITH SINGLE EPISODE, IN PARTIAL REMISSION (HCC): ICD-10-CM

## 2024-02-07 RX ORDER — ESCITALOPRAM OXALATE 20 MG/1
20 TABLET ORAL DAILY
Qty: 30 TABLET | Refills: 0 | OUTPATIENT
Start: 2024-02-07

## 2024-05-16 ENCOUNTER — OFFICE VISIT (OUTPATIENT)
Dept: FAMILY MEDICINE CLINIC | Age: 59
End: 2024-05-16

## 2024-05-16 VITALS
WEIGHT: 245 LBS | OXYGEN SATURATION: 98 % | DIASTOLIC BLOOD PRESSURE: 80 MMHG | HEART RATE: 70 BPM | SYSTOLIC BLOOD PRESSURE: 130 MMHG | HEIGHT: 68 IN | BODY MASS INDEX: 37.13 KG/M2

## 2024-05-16 DIAGNOSIS — F32.4 MAJOR DEPRESSIVE DISORDER WITH SINGLE EPISODE, IN PARTIAL REMISSION (HCC): ICD-10-CM

## 2024-05-16 DIAGNOSIS — Z12.31 VISIT FOR SCREENING MAMMOGRAM: ICD-10-CM

## 2024-05-16 DIAGNOSIS — R73.01 IMPAIRED FASTING GLUCOSE: ICD-10-CM

## 2024-05-16 DIAGNOSIS — E78.2 MIXED HYPERLIPIDEMIA: ICD-10-CM

## 2024-05-16 DIAGNOSIS — E66.01 SEVERE OBESITY (BMI 35.0-39.9) WITH COMORBIDITY (HCC): ICD-10-CM

## 2024-05-16 DIAGNOSIS — I10 HYPERTENSION, ESSENTIAL: Primary | ICD-10-CM

## 2024-05-16 DIAGNOSIS — Z13.1 SCREENING FOR DIABETES MELLITUS: ICD-10-CM

## 2024-05-16 DIAGNOSIS — Z13.220 SCREENING FOR LIPID DISORDERS: ICD-10-CM

## 2024-05-16 LAB — HBA1C MFR BLD: 5.9 %

## 2024-05-16 PROCEDURE — 99212 OFFICE O/P EST SF 10 MIN: CPT | Performed by: FAMILY MEDICINE

## 2024-05-16 PROCEDURE — 3075F SYST BP GE 130 - 139MM HG: CPT | Performed by: FAMILY MEDICINE

## 2024-05-16 PROCEDURE — 3079F DIAST BP 80-89 MM HG: CPT | Performed by: FAMILY MEDICINE

## 2024-05-16 PROCEDURE — PBSHW POCT GLYCOSYLATED HEMOGLOBIN (HGB A1C): Performed by: FAMILY MEDICINE

## 2024-05-16 PROCEDURE — 99214 OFFICE O/P EST MOD 30 MIN: CPT | Performed by: FAMILY MEDICINE

## 2024-05-16 PROCEDURE — 83036 HEMOGLOBIN GLYCOSYLATED A1C: CPT | Performed by: FAMILY MEDICINE

## 2024-05-16 RX ORDER — ESCITALOPRAM OXALATE 10 MG/1
15 TABLET ORAL DAILY
Qty: 135 TABLET | Refills: 1 | Status: SHIPPED | OUTPATIENT
Start: 2024-05-16

## 2024-05-16 SDOH — ECONOMIC STABILITY: FOOD INSECURITY: WITHIN THE PAST 12 MONTHS, THE FOOD YOU BOUGHT JUST DIDN'T LAST AND YOU DIDN'T HAVE MONEY TO GET MORE.: NEVER TRUE

## 2024-05-16 SDOH — ECONOMIC STABILITY: HOUSING INSECURITY
IN THE LAST 12 MONTHS, WAS THERE A TIME WHEN YOU DID NOT HAVE A STEADY PLACE TO SLEEP OR SLEPT IN A SHELTER (INCLUDING NOW)?: NO

## 2024-05-16 SDOH — ECONOMIC STABILITY: FOOD INSECURITY: WITHIN THE PAST 12 MONTHS, YOU WORRIED THAT YOUR FOOD WOULD RUN OUT BEFORE YOU GOT MONEY TO BUY MORE.: NEVER TRUE

## 2024-05-16 SDOH — ECONOMIC STABILITY: INCOME INSECURITY: HOW HARD IS IT FOR YOU TO PAY FOR THE VERY BASICS LIKE FOOD, HOUSING, MEDICAL CARE, AND HEATING?: NOT HARD AT ALL

## 2024-05-16 ASSESSMENT — PATIENT HEALTH QUESTIONNAIRE - PHQ9
6. FEELING BAD ABOUT YOURSELF - OR THAT YOU ARE A FAILURE OR HAVE LET YOURSELF OR YOUR FAMILY DOWN: NOT AT ALL
5. POOR APPETITE OR OVEREATING: NOT AT ALL
3. TROUBLE FALLING OR STAYING ASLEEP: NOT AT ALL
SUM OF ALL RESPONSES TO PHQ QUESTIONS 1-9: 0
8. MOVING OR SPEAKING SO SLOWLY THAT OTHER PEOPLE COULD HAVE NOTICED. OR THE OPPOSITE, BEING SO FIGETY OR RESTLESS THAT YOU HAVE BEEN MOVING AROUND A LOT MORE THAN USUAL: NOT AT ALL
7. TROUBLE CONCENTRATING ON THINGS, SUCH AS READING THE NEWSPAPER OR WATCHING TELEVISION: NOT AT ALL
SUM OF ALL RESPONSES TO PHQ QUESTIONS 1-9: 0
4. FEELING TIRED OR HAVING LITTLE ENERGY: NOT AT ALL
SUM OF ALL RESPONSES TO PHQ QUESTIONS 1-9: 0
9. THOUGHTS THAT YOU WOULD BE BETTER OFF DEAD, OR OF HURTING YOURSELF: NOT AT ALL
10. IF YOU CHECKED OFF ANY PROBLEMS, HOW DIFFICULT HAVE THESE PROBLEMS MADE IT FOR YOU TO DO YOUR WORK, TAKE CARE OF THINGS AT HOME, OR GET ALONG WITH OTHER PEOPLE: NOT DIFFICULT AT ALL
SUM OF ALL RESPONSES TO PHQ9 QUESTIONS 1 & 2: 0
SUM OF ALL RESPONSES TO PHQ QUESTIONS 1-9: 0
2. FEELING DOWN, DEPRESSED OR HOPELESS: NOT AT ALL
1. LITTLE INTEREST OR PLEASURE IN DOING THINGS: NOT AT ALL

## 2024-05-16 NOTE — PROGRESS NOTES
(EPIPEN 2-ALANNAH) 0.3 MG/0.3ML SOAJ injection Use as directed for allergic reaction. 1 each 1    Multiple Vitamins-Minerals (THERAPEUTIC MULTIVITAMIN-MINERALS) tablet Take 1 tablet by mouth daily      cetirizine (ZYRTEC) 10 MG tablet Take 1 tablet by mouth daily      nitroGLYCERIN (NITROSTAT) 0.4 MG SL tablet place 1 tablet under the tongue if needed every 5 TO 15 MINUTES f...  (REFER TO PRESCRIPTION NOTES).       No current facility-administered medications for this visit.     No Known Allergies    Health Maintenance   Topic Date Due    Hepatitis B vaccine (1 of 3 - 3-dose series) Never done    HIV screen  Never done    Hepatitis C screen  Never done    Breast cancer screen  07/18/2023    COVID-19 Vaccine (5 - 2023-24 season) 09/01/2023    A1C test (Diabetic or Prediabetic)  05/16/2025    Depression Monitoring  05/16/2025    Lipids  03/04/2028    Colorectal Cancer Screen  01/04/2029    DTaP/Tdap/Td vaccine (2 - Td or Tdap) 01/05/2033    Flu vaccine  Completed    Shingles vaccine  Completed    Hepatitis A vaccine  Aged Out    Hib vaccine  Aged Out    Polio vaccine  Aged Out    Meningococcal (ACWY) vaccine  Aged Out    Pneumococcal 0-64 years Vaccine  Aged Out    Diabetes screen  Discontinued    Cervical cancer screen  Discontinued         Review of Systems    Objective:     /80 (Site: Left Upper Arm, Position: Sitting, Cuff Size: Medium Adult)   Pulse 70   Ht 1.727 m (5' 8\")   Wt 111.1 kg (245 lb)   SpO2 98%   BMI 37.25 kg/m²     Physical Exam  Vitals and nursing note reviewed.   Constitutional:       Appearance: Normal appearance. She is well-developed.   HENT:      Head: Normocephalic and atraumatic.      Right Ear: External ear normal.      Left Ear: External ear normal.   Eyes:      Extraocular Movements: Extraocular movements intact.      Conjunctiva/sclera: Conjunctivae normal.   Neck:      Thyroid: No thyromegaly.      Vascular: No JVD.   Cardiovascular:      Rate and Rhythm: Normal rate and regular

## 2024-06-06 LAB
CHOLESTEROL, TOTAL: 221 MG/DL (ref 50–200)
CHOLESTEROL/HDL RATIO: 5.14 RATIO (ref 0–4.5)
HDLC SERPL-MCNC: 43 MG/DL (ref 36–68)
LDL CHOLESTEROL: 131.6 MG/DL (ref 0–160)
TRIGL SERPL-MCNC: 232 MG/DL (ref 10–250)
VLDLC SERPL CALC-MCNC: 46 MG/DL (ref 0–50)

## 2024-11-11 DIAGNOSIS — F32.4 MAJOR DEPRESSIVE DISORDER WITH SINGLE EPISODE, IN PARTIAL REMISSION (HCC): ICD-10-CM

## 2024-11-11 RX ORDER — ESCITALOPRAM OXALATE 10 MG/1
15 TABLET ORAL DAILY
Qty: 135 TABLET | Refills: 1 | Status: SHIPPED | OUTPATIENT
Start: 2024-11-11

## 2024-11-11 NOTE — TELEPHONE ENCOUNTER
Yesica Thomas is requesting a refill on the following medication(s):  Requested Prescriptions     Pending Prescriptions Disp Refills    escitalopram (LEXAPRO) 10 MG tablet 135 tablet 1     Sig: Take 1.5 tablets by mouth daily       Last Visit Date (If Applicable):  5/16/2024    Next Visit Date:    Visit date not found

## 2025-05-02 DIAGNOSIS — F32.4 MAJOR DEPRESSIVE DISORDER WITH SINGLE EPISODE, IN PARTIAL REMISSION: ICD-10-CM

## 2025-05-05 RX ORDER — ESCITALOPRAM OXALATE 10 MG/1
TABLET ORAL
Qty: 135 TABLET | Refills: 0 | Status: SHIPPED | OUTPATIENT
Start: 2025-05-05

## 2025-05-05 NOTE — TELEPHONE ENCOUNTER
Yesica Thomas is requesting a refill on the following medication(s):  Requested Prescriptions     Pending Prescriptions Disp Refills    escitalopram (LEXAPRO) 10 MG tablet [Pharmacy Med Name: ESCITALOPRAM TABS 10MG] 135 tablet 3     Sig: TAKE ONE AND ONE-HALF TABLETS DAILY       Last Visit Date (If Applicable):  5/16/2024    Next Visit Date:    Visit date not found
